# Patient Record
Sex: MALE | Race: WHITE | NOT HISPANIC OR LATINO | URBAN - METROPOLITAN AREA
[De-identification: names, ages, dates, MRNs, and addresses within clinical notes are randomized per-mention and may not be internally consistent; named-entity substitution may affect disease eponyms.]

---

## 2021-03-17 ENCOUNTER — IMMUNIZATIONS (OUTPATIENT)
Dept: FAMILY MEDICINE CLINIC | Facility: HOSPITAL | Age: 71
End: 2021-03-17

## 2021-03-17 DIAGNOSIS — Z23 ENCOUNTER FOR IMMUNIZATION: Primary | ICD-10-CM

## 2021-03-17 PROCEDURE — 91301 SARS-COV-2 / COVID-19 MRNA VACCINE (MODERNA) 100 MCG: CPT

## 2021-03-17 PROCEDURE — 0011A SARS-COV-2 / COVID-19 MRNA VACCINE (MODERNA) 100 MCG: CPT

## 2021-04-14 ENCOUNTER — IMMUNIZATIONS (OUTPATIENT)
Dept: FAMILY MEDICINE CLINIC | Facility: HOSPITAL | Age: 71
End: 2021-04-14

## 2021-04-14 DIAGNOSIS — Z23 ENCOUNTER FOR IMMUNIZATION: Primary | ICD-10-CM

## 2021-04-14 PROCEDURE — 91301 SARS-COV-2 / COVID-19 MRNA VACCINE (MODERNA) 100 MCG: CPT

## 2021-04-14 PROCEDURE — 0012A SARS-COV-2 / COVID-19 MRNA VACCINE (MODERNA) 100 MCG: CPT

## 2022-10-20 ENCOUNTER — NURSING HOME VISIT (OUTPATIENT)
Dept: GERIATRICS | Facility: OTHER | Age: 72
End: 2022-10-20
Payer: COMMERCIAL

## 2022-10-20 DIAGNOSIS — E87.1 HYPONATREMIA: ICD-10-CM

## 2022-10-20 DIAGNOSIS — D62 ACUTE BLOOD LOSS ANEMIA: ICD-10-CM

## 2022-10-20 DIAGNOSIS — G06.0 BRAIN ABSCESS: Primary | ICD-10-CM

## 2022-10-20 DIAGNOSIS — R53.1 RIGHT SIDED WEAKNESS: ICD-10-CM

## 2022-10-20 DIAGNOSIS — R13.12 OROPHARYNGEAL DYSPHAGIA: ICD-10-CM

## 2022-10-20 DIAGNOSIS — E11.21 TYPE 2 DIABETES MELLITUS WITH DIABETIC NEPHROPATHY, WITHOUT LONG-TERM CURRENT USE OF INSULIN (HCC): ICD-10-CM

## 2022-10-20 DIAGNOSIS — N18.32 STAGE 3B CHRONIC KIDNEY DISEASE (HCC): ICD-10-CM

## 2022-10-20 DIAGNOSIS — R26.2 AMBULATORY DYSFUNCTION: ICD-10-CM

## 2022-10-20 DIAGNOSIS — A43.9 NOCARDIA INFECTION: ICD-10-CM

## 2022-10-20 DIAGNOSIS — R47.1 DYSARTHRIA: ICD-10-CM

## 2022-10-20 DIAGNOSIS — I48.19 PERSISTENT ATRIAL FIBRILLATION (HCC): ICD-10-CM

## 2022-10-20 PROBLEM — R19.5 OCCULT BLOOD IN STOOLS: Status: ACTIVE | Noted: 2022-07-27

## 2022-10-20 PROBLEM — R41.89 IMPAIRED COGNITION: Status: ACTIVE | Noted: 2022-09-22

## 2022-10-20 PROBLEM — Z78.9 IMPAIRED MOBILITY AND ADLS: Status: ACTIVE | Noted: 2022-09-22

## 2022-10-20 PROBLEM — H40.10X0 OPEN-ANGLE GLAUCOMA OF BOTH EYES: Status: ACTIVE | Noted: 2021-06-30

## 2022-10-20 PROBLEM — D72.823 LEUKEMOID REACTION: Status: ACTIVE | Noted: 2022-07-13

## 2022-10-20 PROBLEM — E78.5 HYPERLIPIDEMIA: Status: ACTIVE | Noted: 2019-07-29

## 2022-10-20 PROBLEM — E11.40 TYPE 2 DIABETES MELLITUS WITH DIABETIC NEUROPATHY, UNSPECIFIED (HCC): Status: ACTIVE | Noted: 2021-07-08

## 2022-10-20 PROBLEM — J30.0 VASOMOTOR RHINITIS: Status: ACTIVE | Noted: 2020-03-11

## 2022-10-20 PROBLEM — N18.30 CKD (CHRONIC KIDNEY DISEASE) STAGE 3, GFR 30-59 ML/MIN (HCC): Status: ACTIVE | Noted: 2022-07-07

## 2022-10-20 PROBLEM — M10.9 GOUT: Status: ACTIVE | Noted: 2022-07-27

## 2022-10-20 PROBLEM — G56.00 CARPAL TUNNEL SYNDROME: Status: ACTIVE | Noted: 2019-06-18

## 2022-10-20 PROBLEM — D69.6 THROMBOCYTOPENIA (HCC): Status: ACTIVE | Noted: 2022-08-03

## 2022-10-20 PROBLEM — Z74.09 IMPAIRED MOBILITY AND ADLS: Status: ACTIVE | Noted: 2022-09-22

## 2022-10-20 PROBLEM — E43 SEVERE PROTEIN-CALORIE MALNUTRITION (HCC): Status: ACTIVE | Noted: 2022-09-29

## 2022-10-20 PROBLEM — R11.10 POST-TUSSIVE VOMITING: Status: ACTIVE | Noted: 2022-10-19

## 2022-10-20 PROBLEM — M51.16 HERNIATION OF LUMBAR INTERVERTEBRAL DISC WITH RADICULOPATHY: Status: ACTIVE | Noted: 2019-07-29

## 2022-10-20 PROCEDURE — 99306 1ST NF CARE HIGH MDM 50: CPT | Performed by: INTERNAL MEDICINE

## 2022-10-20 RX ORDER — TRAZODONE HYDROCHLORIDE 100 MG/1
100 TABLET ORAL
COMMUNITY

## 2022-10-20 RX ORDER — CYCLOSPORINE 0.5 MG/ML
1 EMULSION OPHTHALMIC 2 TIMES DAILY
COMMUNITY
End: 2022-10-26

## 2022-10-20 RX ORDER — ACETAMINOPHEN 160 MG/5ML
1000 SOLUTION ORAL EVERY 6 HOURS PRN
COMMUNITY

## 2022-10-20 RX ORDER — LANOLIN ALCOHOL/MO/W.PET/CERES
3 CREAM (GRAM) TOPICAL
COMMUNITY

## 2022-10-20 RX ORDER — SULFAMETHOXAZOLE AND TRIMETHOPRIM 200; 40 MG/5ML; MG/5ML
20 SUSPENSION ORAL 4 TIMES DAILY
COMMUNITY

## 2022-10-20 RX ORDER — ONDANSETRON 4 MG/1
4 TABLET, FILM COATED ORAL EVERY 8 HOURS PRN
COMMUNITY

## 2022-10-20 RX ORDER — SIMETHICONE 80 MG
80 TABLET,CHEWABLE ORAL EVERY 6 HOURS PRN
COMMUNITY

## 2022-10-20 RX ORDER — LIDOCAINE 4 G/G
PATCH TOPICAL
COMMUNITY

## 2022-10-20 RX ORDER — OXYCODONE HYDROCHLORIDE 5 MG/1
2.5 TABLET ORAL EVERY 6 HOURS PRN
COMMUNITY

## 2022-10-20 NOTE — ASSESSMENT & PLAN NOTE
norcardia  Continue anbiotics  Continue monitoring for signs of infection   S/p surgery   Continue follow up with ID and neurosurgery

## 2022-10-20 NOTE — ASSESSMENT & PLAN NOTE
Developed after second admission when had hyponatremia and being intubation after 3 rd time he was taken off the vent then he had the tube feed placed
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
## 2022-10-20 NOTE — ASSESSMENT & PLAN NOTE
Lab Results   Component Value Date    HGBA1C 5 1 10/03/2022     Continue with diet control  Continue monitoring blood sugars

## 2022-10-22 ENCOUNTER — TELEPHONE (OUTPATIENT)
Dept: OTHER | Facility: OTHER | Age: 72
End: 2022-10-22

## 2022-10-22 NOTE — TELEPHONE ENCOUNTER
Juan with fellowship calling regarding patient, states that he has a questionable rash, unsure of shingles  Paged on call via TC

## 2022-10-26 ENCOUNTER — NURSING HOME VISIT (OUTPATIENT)
Dept: GERIATRICS | Facility: OTHER | Age: 72
End: 2022-10-26
Payer: COMMERCIAL

## 2022-10-26 DIAGNOSIS — E11.40 TYPE 2 DIABETES MELLITUS WITH DIABETIC NEUROPATHY, WITHOUT LONG-TERM CURRENT USE OF INSULIN (HCC): ICD-10-CM

## 2022-10-26 DIAGNOSIS — R09.82 POST-NASAL DRIP: ICD-10-CM

## 2022-10-26 DIAGNOSIS — R13.12 OROPHARYNGEAL DYSPHAGIA: ICD-10-CM

## 2022-10-26 DIAGNOSIS — I48.19 PERSISTENT ATRIAL FIBRILLATION (HCC): ICD-10-CM

## 2022-10-26 DIAGNOSIS — B02.9 HERPES ZOSTER WITHOUT COMPLICATION: Primary | ICD-10-CM

## 2022-10-26 PROCEDURE — 99310 SBSQ NF CARE HIGH MDM 45: CPT | Performed by: INTERNAL MEDICINE

## 2022-10-26 RX ORDER — GABAPENTIN 100 MG/1
100 CAPSULE ORAL
COMMUNITY

## 2022-10-26 RX ORDER — FLUTICASONE PROPIONATE 50 MCG
1 SPRAY, SUSPENSION (ML) NASAL DAILY
COMMUNITY

## 2022-10-26 NOTE — ASSESSMENT & PLAN NOTE
Lab Results   Component Value Date    HGBA1C 5 1 10/03/2022     Patient starting to have shooting pain in the left legs and used to be on neurontin will start low dose and see how he tolerates and adjust as needed

## 2022-10-26 NOTE — ASSESSMENT & PLAN NOTE
Changed tube feeding overnight as patient was having episodes of vomiting and not taking much orally due to getting tube feed  Continue with speech therapy  Continue monitoring for aspiration
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
## 2022-10-26 NOTE — ASSESSMENT & PLAN NOTE
Discussed with wife about the anticoagulation and the risk of starting  She does understand it and she will also speak with the cardiology and neurosurgery about it before making any decision     Continue current meds

## 2022-11-02 ENCOUNTER — NURSING HOME VISIT (OUTPATIENT)
Dept: GERIATRICS | Facility: OTHER | Age: 72
End: 2022-11-02

## 2022-11-02 DIAGNOSIS — E11.40 TYPE 2 DIABETES MELLITUS WITH DIABETIC NEUROPATHY, WITHOUT LONG-TERM CURRENT USE OF INSULIN (HCC): ICD-10-CM

## 2022-11-02 DIAGNOSIS — G06.0 BRAIN ABSCESS: Primary | ICD-10-CM

## 2022-11-02 DIAGNOSIS — R13.12 OROPHARYNGEAL DYSPHAGIA: ICD-10-CM
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
## 2022-11-02 NOTE — ASSESSMENT & PLAN NOTE
Continues on antibiotics  Continues to work with therapy   Reviewed therapy notes  Discussed with therapist about patient progress  Continue with fall precautions  Continue follow up with neurosurgery

## 2022-11-02 NOTE — ASSESSMENT & PLAN NOTE
Lab Results   Component Value Date    HGBA1C 5 1 10/03/2022     Increase neurontin to 100mg  Am and afternoon but at bedtime 200mg po  Which will also help with sleep along with neuropathy but does report pain has improved     Continue all other current meds

## 2022-11-02 NOTE — ASSESSMENT & PLAN NOTE
Speech therapist working with patient  Discussed with therapist about the progress  Continue with night time tube feeding  Continue with aspiration precautions

## 2022-11-06 DIAGNOSIS — G62.9 NEUROPATHY: Primary | ICD-10-CM

## 2022-11-07 RX ORDER — OXYCODONE HCL 5 MG/5 ML
SOLUTION, ORAL ORAL
Qty: 150 ML | Refills: 0 | Status: SHIPPED | OUTPATIENT
Start: 2022-11-07 | End: 2022-11-16

## 2022-11-09 ENCOUNTER — NURSING HOME VISIT (OUTPATIENT)
Dept: GERIATRICS | Facility: OTHER | Age: 72
End: 2022-11-09

## 2022-11-09 DIAGNOSIS — R26.2 AMBULATORY DYSFUNCTION: Primary | ICD-10-CM

## 2022-11-09 DIAGNOSIS — R13.12 OROPHARYNGEAL DYSPHAGIA: ICD-10-CM

## 2022-11-09 DIAGNOSIS — A43.9 NOCARDIA INFECTION: ICD-10-CM

## 2022-11-09 NOTE — ASSESSMENT & PLAN NOTE
Some progress as per therapy and at times fluctuate day to day   Continues with therapy  Discussed with therapist  Continue with safety measures

## 2022-11-09 NOTE — ASSESSMENT & PLAN NOTE
As of late has made some progress  Speech therapist continues to work with patient   Will get a video swallow eval  Continue monitoring for aspiration
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
## 2022-11-15 DIAGNOSIS — G62.9 NEUROPATHY: Primary | ICD-10-CM

## 2022-11-15 RX ORDER — OXYCODONE HYDROCHLORIDE 5 MG/1
TABLET ORAL
Qty: 30 TABLET | Refills: 0 | Status: SHIPPED | OUTPATIENT
Start: 2022-11-15

## 2022-11-16 ENCOUNTER — NURSING HOME VISIT (OUTPATIENT)
Dept: GERIATRICS | Facility: OTHER | Age: 72
End: 2022-11-16

## 2022-11-16 DIAGNOSIS — R13.12 OROPHARYNGEAL DYSPHAGIA: Primary | ICD-10-CM

## 2022-11-16 DIAGNOSIS — I48.19 PERSISTENT ATRIAL FIBRILLATION (HCC): ICD-10-CM

## 2022-11-16 DIAGNOSIS — R53.1 RIGHT SIDED WEAKNESS: ICD-10-CM

## 2022-11-16 RX ORDER — ACETAMINOPHEN 325 MG/1
650 TABLET ORAL EVERY 6 HOURS PRN
COMMUNITY

## 2022-11-16 RX ORDER — CHOLECALCIFEROL (VITAMIN D3) 125 MCG
1 CAPSULE ORAL
COMMUNITY

## 2022-11-16 RX ORDER — LANSOPRAZOLE 15 MG/1
15 CAPSULE, DELAYED RELEASE ORAL DAILY
COMMUNITY

## 2022-11-16 NOTE — ASSESSMENT & PLAN NOTE
Continues to work with therapy  Reviewed therapy notes  Discussed with therapist about progress  Continue with fall precautions

## 2022-11-16 NOTE — ASSESSMENT & PLAN NOTE
Rate controlled  Has a video visit with cardiology tomorrow  Also asked patient to discussed with cardiology about the blood thinner  Continue current meds
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
## 2022-11-16 NOTE — ASSESSMENT & PLAN NOTE
Tube feeding stopped  Video swallow study was then done and did well  Now on regular diet  Continue monitoring for aspiration  Also changed meds to oral meds

## 2022-11-23 ENCOUNTER — NURSING HOME VISIT (OUTPATIENT)
Dept: GERIATRICS | Facility: OTHER | Age: 72
End: 2022-11-23

## 2022-11-23 DIAGNOSIS — R53.1 RIGHT SIDED WEAKNESS: Primary | ICD-10-CM

## 2022-11-23 DIAGNOSIS — R13.12 OROPHARYNGEAL DYSPHAGIA: ICD-10-CM

## 2022-11-23 DIAGNOSIS — N18.32 STAGE 3B CHRONIC KIDNEY DISEASE (HCC): ICD-10-CM

## 2022-11-23 RX ORDER — CETIRIZINE HYDROCHLORIDE 10 MG/1
10 TABLET ORAL DAILY
COMMUNITY

## 2022-11-23 RX ORDER — SULFAMETHOXAZOLE AND TRIMETHOPRIM 800; 160 MG/1; MG/1
1 TABLET ORAL EVERY 12 HOURS SCHEDULED
COMMUNITY

## 2022-11-23 NOTE — ASSESSMENT & PLAN NOTE
Continues to work with therapy  Reviewed therapy notes  Discussed with therapist about patient  Continue with safety measures
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
## 2022-11-23 NOTE — ASSESSMENT & PLAN NOTE
All meds and feeding changed from tube to oral  Continue current regular diet  Continue monitoring for aspiration  Discussed with speech therapist about the patient

## 2022-12-13 ENCOUNTER — NURSING HOME VISIT (OUTPATIENT)
Dept: GERIATRICS | Facility: OTHER | Age: 72
End: 2022-12-13

## 2022-12-13 DIAGNOSIS — I48.19 PERSISTENT ATRIAL FIBRILLATION (HCC): ICD-10-CM

## 2022-12-13 DIAGNOSIS — R13.12 OROPHARYNGEAL DYSPHAGIA: ICD-10-CM

## 2022-12-13 DIAGNOSIS — R26.2 AMBULATORY DYSFUNCTION: Primary | ICD-10-CM

## 2022-12-13 RX ORDER — ACETAMINOPHEN 500 MG
500 TABLET ORAL EVERY 12 HOURS
COMMUNITY

## 2022-12-13 RX ORDER — SENNA AND DOCUSATE SODIUM 50; 8.6 MG/1; MG/1
1 TABLET, FILM COATED ORAL DAILY
COMMUNITY

## 2022-12-13 RX ORDER — CYCLOSPORINE 0.5 MG/ML
1 EMULSION OPHTHALMIC EVERY 12 HOURS
COMMUNITY

## 2022-12-13 RX ORDER — POLYETHYLENE GLYCOL 3350 17 G/17G
17 POWDER, FOR SOLUTION ORAL DAILY
COMMUNITY
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
## 2022-12-13 NOTE — ASSESSMENT & PLAN NOTE
Off of therapy  Reviewed patient last therapy notes  Now on restorative  Continue with restorative  Continue with safety measures

## 2022-12-13 NOTE — ASSESSMENT & PLAN NOTE
Taking all meds and eating orally  Will get GI to evaluate for removal of tube  Continue current diet  Continue monitoring for aspiration

## 2022-12-13 NOTE — ASSESSMENT & PLAN NOTE
Talked to wife about the visit and reports that they want to wait on restarting the anticoagulation  Continue current meds  Continue monitoring rate

## 2023-01-13 ENCOUNTER — TELEPHONE (OUTPATIENT)
Dept: OTHER | Facility: OTHER | Age: 73
End: 2023-01-13

## 2023-01-14 NOTE — TELEPHONE ENCOUNTER
[address] nurse from [address] [place] [address] has called to inform the oncal provider that the patients joshua has a blockage and unable to insert new one in  His belly is distended and need the ok to send to the Er   Please call [facility] - [facility] Hamlin Deshawn- [phone]

## 2023-01-16 ENCOUNTER — NURSING HOME VISIT (OUTPATIENT)
Dept: GERIATRICS | Facility: OTHER | Age: 73
End: 2023-01-16

## 2023-01-16 DIAGNOSIS — N18.32 STAGE 3B CHRONIC KIDNEY DISEASE (HCC): Primary | ICD-10-CM

## 2023-01-16 DIAGNOSIS — A43.9 NOCARDIA INFECTION: ICD-10-CM

## 2023-01-16 DIAGNOSIS — D62 ACUTE BLOOD LOSS ANEMIA: ICD-10-CM

## 2023-01-16 PROBLEM — N13.9 OBSTRUCTIVE UROPATHY: Status: ACTIVE | Noted: 2023-01-16

## 2023-01-16 NOTE — ASSESSMENT & PLAN NOTE
Joshua was difficult to change at the facility as result patient was sent to the ER  Patient had joshua cath placed and returned  He did have urine culture sent at the hospital    As per wife the urine is growing Klebsiella but not sensitivity out yet  He is on bactrim and currently no other symptoms present will wait on culture results and does have follow up with ID which wife reports will discuss with them also
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
## 2023-01-18 ENCOUNTER — NURSING HOME VISIT (OUTPATIENT)
Dept: GERIATRICS | Facility: OTHER | Age: 73
End: 2023-01-18

## 2023-01-18 DIAGNOSIS — N18.9 ACUTE KIDNEY INJURY SUPERIMPOSED ON CKD (HCC): Primary | ICD-10-CM

## 2023-01-18 DIAGNOSIS — B96.89 URINARY TRACT INFECTION DUE TO ESBL KLEBSIELLA: ICD-10-CM

## 2023-01-18 DIAGNOSIS — D64.9 ANEMIA, UNSPECIFIED TYPE: ICD-10-CM

## 2023-01-18 DIAGNOSIS — N17.9 ACUTE KIDNEY INJURY SUPERIMPOSED ON CKD (HCC): Primary | ICD-10-CM

## 2023-01-18 DIAGNOSIS — N39.0 URINARY TRACT INFECTION DUE TO ESBL KLEBSIELLA: ICD-10-CM

## 2023-01-18 NOTE — ASSESSMENT & PLAN NOTE
Hb is 8 8 trending down with his kidney function being worst if fluids given the H/H will look lower  No active sign of bleeding  Will wait wife to get back and if he stays will repeat CBC

## 2023-01-18 NOTE — ASSESSMENT & PLAN NOTE
Urine culture from the hospital came back ESBL klebsiella which is resistant to bactrim which he is on for nocardia infection    Wife reports will talk to the ID and call back
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
## 2023-01-18 NOTE — ASSESSMENT & PLAN NOTE
Today's Cr worst then before it was trending good in November to 1 37 but today is 2 87 which has significantly worsened  Patient reports no change in fluid intake  Did have problem with catheter but was changed in the ER because difficulty changing at the facility   Talked to wife she will call back about sending him to the hospital vs trying IV fluids at the facility but due to his h/h trending down it maybe better to go to hospital I told her     Also maybe some contribution from bactrim which wife is going to talk to ID

## 2023-01-26 ENCOUNTER — NURSING HOME VISIT (OUTPATIENT)
Dept: GERIATRICS | Facility: OTHER | Age: 73
End: 2023-01-26

## 2023-01-26 DIAGNOSIS — R18.8 OTHER ASCITES: ICD-10-CM

## 2023-01-26 DIAGNOSIS — N17.9 AKI (ACUTE KIDNEY INJURY) (HCC): Primary | ICD-10-CM

## 2023-01-26 DIAGNOSIS — D64.9 ANEMIA, UNSPECIFIED TYPE: ICD-10-CM

## 2023-01-26 DIAGNOSIS — A43.9 NOCARDIA INFECTION: ICD-10-CM

## 2023-01-26 DIAGNOSIS — E11.40 TYPE 2 DIABETES MELLITUS WITH DIABETIC NEUROPATHY, WITHOUT LONG-TERM CURRENT USE OF INSULIN (HCC): ICD-10-CM

## 2023-01-26 DIAGNOSIS — D69.6 THROMBOCYTOPENIA (HCC): ICD-10-CM

## 2023-01-26 DIAGNOSIS — E83.52 HYPERCALCEMIA: ICD-10-CM

## 2023-01-26 DIAGNOSIS — U07.1 COVID-19 VIRUS INFECTION: ICD-10-CM

## 2023-01-26 PROBLEM — Z98.890 H/O CRANIOTOMY: Status: ACTIVE | Noted: 2023-01-18

## 2023-01-26 PROBLEM — I48.0 PAROXYSMAL ATRIAL FIBRILLATION (HCC): Status: ACTIVE | Noted: 2021-11-24

## 2023-01-26 NOTE — ASSESSMENT & PLAN NOTE
Retested positive in the hospital but was positive in December when he was treated with paxlovid  Patient again treated with paxlovid as per wife     Does not require isolation, most likely residual

## 2023-01-26 NOTE — ASSESSMENT & PLAN NOTE
Bactrim was held in the hospital for few days due to kidney function but restarted after nephrology and ID evaluation

## 2023-01-26 NOTE — ASSESSMENT & PLAN NOTE
Cr 1 87 last one in the hospital  Will repeat BMP  Encourage of fluids   Reviewed nephrology consult  Continue follow up with nephrology

## 2023-01-26 NOTE — ASSESSMENT & PLAN NOTE
Had paracentesis of 3 7L  Had ECHO report EF 60-65% and grade I diastolic dysfunction  Unsure exact cause   Continue monitoring symptoms
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
## 2023-02-14 ENCOUNTER — NURSING HOME VISIT (OUTPATIENT)
Dept: GERIATRICS | Facility: OTHER | Age: 73
End: 2023-02-14

## 2023-02-14 DIAGNOSIS — N18.9 ACUTE KIDNEY INJURY SUPERIMPOSED ON CKD (HCC): Primary | ICD-10-CM

## 2023-02-14 DIAGNOSIS — D64.9 ANEMIA, UNSPECIFIED TYPE: ICD-10-CM

## 2023-02-14 DIAGNOSIS — R14.0 ABDOMINAL DISTENSION: ICD-10-CM

## 2023-02-14 DIAGNOSIS — R18.8 OTHER ASCITES: ICD-10-CM

## 2023-02-14 DIAGNOSIS — N17.9 ACUTE KIDNEY INJURY SUPERIMPOSED ON CKD (HCC): Primary | ICD-10-CM

## 2023-02-14 RX ORDER — SULFAMETHOXAZOLE AND TRIMETHOPRIM 400; 80 MG/1; MG/1
0.5 TABLET ORAL EVERY 12 HOURS SCHEDULED
COMMUNITY
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
## 2023-02-14 NOTE — ASSESSMENT & PLAN NOTE
Xray just showed gas pattern with no obstruction on 2/8/23    Had abdominal ultrasound done which showed small amount of free intraperitoneal fluid (small ascites) and small right pleural effusion   Continue current meds

## 2023-02-22 ENCOUNTER — NURSING HOME VISIT (OUTPATIENT)
Dept: GERIATRICS | Facility: OTHER | Age: 73
End: 2023-02-22

## 2023-02-22 DIAGNOSIS — R18.8 OTHER ASCITES: ICD-10-CM

## 2023-02-22 DIAGNOSIS — R14.0 ABDOMINAL DISTENSION: Primary | ICD-10-CM

## 2023-02-22 DIAGNOSIS — R19.5 LOOSE STOOLS: ICD-10-CM

## 2023-02-22 DIAGNOSIS — N17.9 ACUTE KIDNEY INJURY SUPERIMPOSED ON CKD (HCC): ICD-10-CM

## 2023-02-22 DIAGNOSIS — E87.1 HYPONATREMIA: ICD-10-CM

## 2023-02-22 DIAGNOSIS — N18.9 ACUTE KIDNEY INJURY SUPERIMPOSED ON CKD (HCC): ICD-10-CM

## 2023-02-22 RX ORDER — KETOCONAZOLE 20 MG/G
1 CREAM TOPICAL 2 TIMES DAILY
COMMUNITY

## 2023-02-22 RX ORDER — SIMETHICONE 80 MG
80 TABLET,CHEWABLE ORAL DAILY
COMMUNITY

## 2023-02-22 NOTE — ASSESSMENT & PLAN NOTE
Seems to be worst patient in discomfort  Intermittent episodes of vomiting  Abdominal US repeat ordered and done was waiting for result but patient currently being sent to the hospital    Stat abdominal xray was ordered but not done yet  Concern for ileus vs colitis vs ascites worsening  Discussed with wife if he does have ascites he cannot be tapped here he will have to go out either way     Also has been having poor appetite and loosing weight

## 2023-02-22 NOTE — ASSESSMENT & PLAN NOTE
Sodium again down to 129   Possibly SIADH  He has not been taking much fluids and kidney function is up again     Did discussed with wife about treating with IV fluids but the concern explained to her is that some of the fluids will end up in the abdomen and may his distension worst at which point he will need to have the fluids taped still which maybe better for him to evaluated in the hospital

## 2023-02-22 NOTE — ASSESSMENT & PLAN NOTE
Cr is 1 97 which has gotten worst from 1 44  And discussed as early written with wife about giving fluids but may make the abdominal distention worst

## 2023-02-22 NOTE — ASSESSMENT & PLAN NOTE
Every hour patient seems to be have several episodes of loose stools and not even able to get out of bed because of the loose stool  Had some of his medications decreased and discontinue but continues to have several episodes each hour     Will benefit from GI consult which he was scheduled but maybe better to have it done while in the hospital

## 2023-02-22 NOTE — ASSESSMENT & PLAN NOTE
Unsure the cause  Will need further work up and benefit from eval at the hospital due to multiple active problems at present time  Did have ultrasound done but awaiting results
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
## 2023-03-02 ENCOUNTER — TELEPHONE (OUTPATIENT)
Dept: OTHER | Facility: OTHER | Age: 73
End: 2023-03-02

## 2023-03-03 ENCOUNTER — NURSING HOME VISIT (OUTPATIENT)
Dept: GERIATRICS | Facility: OTHER | Age: 73
End: 2023-03-03

## 2023-03-03 DIAGNOSIS — D64.9 ANEMIA, UNSPECIFIED TYPE: ICD-10-CM

## 2023-03-03 DIAGNOSIS — R18.8 OTHER ASCITES: ICD-10-CM

## 2023-03-03 DIAGNOSIS — A04.72 CLOSTRIDIUM DIFFICILE COLITIS: Primary | ICD-10-CM

## 2023-03-03 DIAGNOSIS — N13.9 OBSTRUCTIVE UROPATHY: ICD-10-CM

## 2023-03-03 DIAGNOSIS — N17.9 AKI (ACUTE KIDNEY INJURY) (HCC): ICD-10-CM

## 2023-03-03 DIAGNOSIS — A43.9 NOCARDIA INFECTION: ICD-10-CM

## 2023-03-03 DIAGNOSIS — E87.1 HYPONATREMIA: ICD-10-CM

## 2023-03-03 DIAGNOSIS — R26.2 AMBULATORY DYSFUNCTION: ICD-10-CM

## 2023-03-03 PROBLEM — K52.9 COLITIS: Status: ACTIVE | Noted: 2023-02-22

## 2023-03-03 PROBLEM — D72.829 LEUKOCYTOSIS: Status: ACTIVE | Noted: 2023-02-22

## 2023-03-03 RX ORDER — SPIRONOLACTONE 25 MG/1
25 TABLET ORAL DAILY
COMMUNITY

## 2023-03-03 RX ORDER — SODIUM BICARBONATE 650 MG/1
1300 TABLET ORAL 2 TIMES DAILY
COMMUNITY

## 2023-03-03 RX ORDER — SENNA AND DOCUSATE SODIUM 50; 8.6 MG/1; MG/1
1 TABLET, FILM COATED ORAL
COMMUNITY

## 2023-03-03 RX ORDER — POLYETHYLENE GLYCOL 3350 17 G/17G
17 POWDER, FOR SOLUTION ORAL DAILY
COMMUNITY

## 2023-03-03 RX ORDER — FUROSEMIDE 20 MG/1
20 TABLET ORAL DAILY
COMMUNITY

## 2023-03-03 RX ORDER — VANCOMYCIN HYDROCHLORIDE 125 MG/1
125 CAPSULE ORAL 4 TIMES DAILY
COMMUNITY
End: 2023-03-08

## 2023-03-03 NOTE — ASSESSMENT & PLAN NOTE
Still not sure what exact cause is  CT did show cirrhosis of liver but does not seem to be the major contributing factor as per report  Had several removal done of ascites with also albumin  About 5L first time then 4L few days later and last about 2L before coming to the facility  Will repeat US next Wednesday  Continue follow up with GI

## 2023-03-03 NOTE — ASSESSMENT & PLAN NOTE
Improved now as Cr is 1 06  Continue encouraging oral intake  Will repeat and monitor as patient started on aldactone and lasix   Was seen by nephrology in the hospital

## 2023-03-03 NOTE — ASSESSMENT & PLAN NOTE
Multifactorial but mainly due to weakness from the brain surgery  Continue with activity as tolerable   Continue with safety measures

## 2023-03-19 PROBLEM — B96.89 URINARY TRACT INFECTION DUE TO ESBL KLEBSIELLA: Status: RESOLVED | Noted: 2023-01-18 | Resolved: 2023-03-19

## 2023-03-19 PROBLEM — N39.0 URINARY TRACT INFECTION DUE TO ESBL KLEBSIELLA: Status: RESOLVED | Noted: 2023-01-18 | Resolved: 2023-03-19

## 2023-04-07 ENCOUNTER — NURSING HOME VISIT (OUTPATIENT)
Dept: GERIATRICS | Facility: OTHER | Age: 73
End: 2023-04-07

## 2023-04-07 DIAGNOSIS — N18.32 STAGE 3B CHRONIC KIDNEY DISEASE (HCC): Primary | ICD-10-CM

## 2023-04-07 DIAGNOSIS — R26.2 AMBULATORY DYSFUNCTION: ICD-10-CM

## 2023-04-07 DIAGNOSIS — I48.0 PAROXYSMAL ATRIAL FIBRILLATION (HCC): ICD-10-CM

## 2023-04-07 DIAGNOSIS — R18.8 OTHER ASCITES: ICD-10-CM

## 2023-04-07 DIAGNOSIS — A04.72 CLOSTRIDIUM DIFFICILE COLITIS: ICD-10-CM
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
## 2023-04-07 NOTE — ASSESSMENT & PLAN NOTE
Had paracentesis done as out patient last month  Will repeat US of abdomen next week  Continue monitoring for worsening symptoms  Continue with GI follow up as scheduled

## 2023-04-07 NOTE — ASSESSMENT & PLAN NOTE
Continues to work with therapy  Patient making progress  Discussed with therapy about patient  Continue with safety measures   Continue with fall precautions

## 2023-05-03 ENCOUNTER — NURSING HOME VISIT (OUTPATIENT)
Dept: GERIATRICS | Facility: OTHER | Age: 73
End: 2023-05-03

## 2023-05-03 DIAGNOSIS — R18.8 OTHER ASCITES: Primary | ICD-10-CM

## 2023-05-03 DIAGNOSIS — R26.2 AMBULATORY DYSFUNCTION: ICD-10-CM

## 2023-05-03 DIAGNOSIS — N18.31 STAGE 3A CHRONIC KIDNEY DISEASE (HCC): ICD-10-CM

## 2023-05-03 PROBLEM — I10 ESSENTIAL (PRIMARY) HYPERTENSION: Status: ACTIVE | Noted: 2023-04-12

## 2023-05-03 PROBLEM — E87.22 CHRONIC METABOLIC ACIDOSIS: Status: ACTIVE | Noted: 2023-04-12

## 2023-05-03 PROBLEM — K74.60 CIRRHOSIS OF LIVER (HCC): Status: ACTIVE | Noted: 2023-04-12

## 2023-05-03 PROBLEM — Z96.0: Status: ACTIVE | Noted: 2023-04-12

## 2023-05-03 PROBLEM — Z79.2 LONG TERM (CURRENT) USE OF ANTIBIOTICS: Status: ACTIVE | Noted: 2023-04-12

## 2023-05-03 NOTE — ASSESSMENT & PLAN NOTE
Distended abdomen with discomfort  Had US sound done which shows ascites and since abdomen is more distended GI was contacted and had scheduled patient for paracentesis for tomorrow  Continue monitoring symptoms   Discussed the result with wife   Also has liver biopsy scheduled
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
## 2023-05-03 NOTE — ASSESSMENT & PLAN NOTE
Due to his overall condition and requiring to be at a facility wrote a letter and was given to wife due to his condition     Continue with restorative, now off of therapy

## 2023-06-09 ENCOUNTER — NURSING HOME VISIT (OUTPATIENT)
Dept: GERIATRICS | Facility: OTHER | Age: 73
End: 2023-06-09
Payer: COMMERCIAL

## 2023-06-09 DIAGNOSIS — K59.00 CONSTIPATION, UNSPECIFIED CONSTIPATION TYPE: ICD-10-CM

## 2023-06-09 DIAGNOSIS — N13.9 OBSTRUCTIVE UROPATHY: Primary | ICD-10-CM

## 2023-06-09 DIAGNOSIS — R18.8 OTHER ASCITES: ICD-10-CM

## 2023-06-09 PROCEDURE — 99309 SBSQ NF CARE MODERATE MDM 30: CPT | Performed by: INTERNAL MEDICINE

## 2023-06-09 RX ORDER — TAMSULOSIN HYDROCHLORIDE 0.4 MG/1
0.4 CAPSULE ORAL
COMMUNITY

## 2023-06-09 NOTE — ASSESSMENT & PLAN NOTE
Symptoms seems to be worst  Now scheduled for paracentesis today  Continue monitoring symptoms after  Continue follow up with GI
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
## 2023-06-09 NOTE — ASSESSMENT & PLAN NOTE
Continue current meds  Continue monitoring symptoms   Informed patient if he still has problems after paracentesis which maybe contributing to his symptoms then will adjust meds

## 2023-07-17 ENCOUNTER — NURSING HOME VISIT (OUTPATIENT)
Dept: GERIATRICS | Facility: OTHER | Age: 73
End: 2023-07-17
Payer: COMMERCIAL

## 2023-07-17 DIAGNOSIS — I48.0 PAROXYSMAL ATRIAL FIBRILLATION (HCC): ICD-10-CM

## 2023-07-17 DIAGNOSIS — R18.8 OTHER ASCITES: Primary | ICD-10-CM

## 2023-07-17 DIAGNOSIS — N17.9 AKI (ACUTE KIDNEY INJURY) (HCC): ICD-10-CM

## 2023-07-17 PROCEDURE — 99309 SBSQ NF CARE MODERATE MDM 30: CPT | Performed by: INTERNAL MEDICINE

## 2023-07-17 NOTE — ASSESSMENT & PLAN NOTE
Rate controlled  Continue current meds  Continue monitoring rate  Continue follow up as scheduled with cardiology

## 2023-07-17 NOTE — ASSESSMENT & PLAN NOTE
Continues to get tapped to decreased fluids  Scheduled to get an ECHO today prior to Cardiology follow up  Continue current meds  Continue monitoring symptoms

## 2023-07-17 NOTE — ASSESSMENT & PLAN NOTE
Cr slowly trending up now at 2.00  Continue monitoring closely   Will repeat tomorrow and fax over results to nephrology after
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
## 2023-08-07 ENCOUNTER — NURSING HOME VISIT (OUTPATIENT)
Dept: GERIATRICS | Facility: OTHER | Age: 73
End: 2023-08-07
Payer: COMMERCIAL

## 2023-08-07 DIAGNOSIS — R18.8 OTHER ASCITES: ICD-10-CM

## 2023-08-07 DIAGNOSIS — G62.9 POLYNEUROPATHY: Primary | ICD-10-CM

## 2023-08-07 DIAGNOSIS — R26.2 AMBULATORY DYSFUNCTION: ICD-10-CM

## 2023-08-07 DIAGNOSIS — N13.9 OBSTRUCTIVE UROPATHY: ICD-10-CM

## 2023-08-07 PROCEDURE — 99309 SBSQ NF CARE MODERATE MDM 30: CPT | Performed by: INTERNAL MEDICINE

## 2023-08-07 RX ORDER — ALLOPURINOL 100 MG/1
50 TABLET ORAL DAILY
COMMUNITY
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
## 2023-09-08 ENCOUNTER — NURSING HOME VISIT (OUTPATIENT)
Dept: GERIATRICS | Facility: OTHER | Age: 73
End: 2023-09-08
Payer: COMMERCIAL

## 2023-09-08 DIAGNOSIS — N17.9 ACUTE KIDNEY INJURY SUPERIMPOSED ON CKD (HCC): Primary | ICD-10-CM

## 2023-09-08 DIAGNOSIS — K59.00 CONSTIPATION, UNSPECIFIED CONSTIPATION TYPE: ICD-10-CM

## 2023-09-08 DIAGNOSIS — D64.9 ANEMIA, UNSPECIFIED TYPE: ICD-10-CM

## 2023-09-08 DIAGNOSIS — N18.9 ACUTE KIDNEY INJURY SUPERIMPOSED ON CKD (HCC): Primary | ICD-10-CM

## 2023-09-08 DIAGNOSIS — G06.0 BRAIN ABSCESS: ICD-10-CM

## 2023-09-08 PROBLEM — N20.0 NEPHROLITHIASIS: Status: ACTIVE | Noted: 2023-08-17

## 2023-09-08 PROBLEM — I12.9 HYPERTENSIVE CHRONIC KIDNEY DISEASE WITH STAGE 1 THROUGH STAGE 4 CHRONIC KIDNEY DISEASE, OR UNSPECIFIED CHRONIC KIDNEY DISEASE: Status: ACTIVE | Noted: 2023-04-12

## 2023-09-08 PROBLEM — R82.998 CRYSTALLURIA: Status: ACTIVE | Noted: 2023-08-17

## 2023-09-08 PROCEDURE — 99309 SBSQ NF CARE MODERATE MDM 30: CPT | Performed by: INTERNAL MEDICINE
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
## 2023-09-11 ENCOUNTER — NURSING HOME VISIT (OUTPATIENT)
Dept: WOUND CARE | Facility: HOSPITAL | Age: 73
End: 2023-09-11
Payer: COMMERCIAL

## 2023-09-11 DIAGNOSIS — R26.2 AMBULATORY DYSFUNCTION: ICD-10-CM

## 2023-09-11 DIAGNOSIS — E11.40 TYPE 2 DIABETES MELLITUS WITH DIABETIC NEUROPATHY, WITHOUT LONG-TERM CURRENT USE OF INSULIN (HCC): ICD-10-CM

## 2023-09-11 DIAGNOSIS — T14.8XXA DEEP TISSUE INJURY: Primary | ICD-10-CM

## 2023-09-11 PROCEDURE — 99304 1ST NF CARE SF/LOW MDM 25: CPT | Performed by: NURSE PRACTITIONER
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
## 2023-09-11 NOTE — ASSESSMENT & PLAN NOTE
A1C results reviewed with the patient today.   Lab Results   Component Value Date    HGBA1C 4.8 05/05/2023   Under the care of Senior care team

## 2023-09-11 NOTE — ASSESSMENT & PLAN NOTE
Left buttock  Purple, nonblanchable, with no obvious sign of infection  Local wound care with BPCO  Education provided to patient to continue to offload when in wheelchair.   Order air mattress  Follow-up next week

## 2023-09-11 NOTE — LETTER
Patient:  Franki Yuan   1950           JORDAN Nick saw Franki Yuan for a wound care visit on 9/11/2023. See below for information relating to this visit. No chief complaint on file. Assessment/Plan:  1. Deep tissue injury  Assessment & Plan:  Left buttock  Purple, nonblanchable, with no obvious sign of infection  Local wound care with BPCO  Education provided to patient to continue to offload when in wheelchair. Order air mattress  Follow-up next week      2. Ambulatory dysfunction  Assessment & Plan:  Patient is wheelchair-bound, will need repositioning when in chair  Work with restorative therapy      3. Type 2 diabetes mellitus with diabetic neuropathy, without long-term current use of insulin ([address])  Assessment & Plan:   A1C results reviewed with the patient today. Lab Results   Component Value Date    HGBA1C 4.8 05/05/2023   Under the care of Senior care team             Orders:  Franki Yuan  1950  Wound:  Left buttock  Discontinue previous wound order  Cleanse the wound bed with NSS   Apply non-sting skin prep to periwound area  Apply BPCO ointment to wound bed  Frequency : twice a day and prn for soiling  Order air mattress  Offload all wounds  Turn and reposition frequently,   Increase protein intake. Monitor for any sign of infection or worsening, inform PCP or patient's primary physician in your facility. Follow Up:  Return in about 1 week (around 9/18/2023). [address] [place] [address] hours are 8:00 am - 4:30 pm Monday through Friday. The center phone number is [phone]. You can also contact me directly thru my email at [facility]. [email or web address] or thru tiger text. If it is an emergency, please contact the PCP or patient's attending physician in your facility.      Sincerely,    Electronically signed by JORDAN Nick    Patient : Franki Yuan    1950

## 2023-09-18 ENCOUNTER — IN HOME VISIT (OUTPATIENT)
Dept: WOUND CARE | Facility: HOSPITAL | Age: 73
End: 2023-09-18
Payer: COMMERCIAL

## 2023-09-18 DIAGNOSIS — R26.2 AMBULATORY DYSFUNCTION: ICD-10-CM

## 2023-09-18 DIAGNOSIS — T14.8XXA DEEP TISSUE INJURY: Primary | ICD-10-CM

## 2023-09-18 PROCEDURE — 99348 HOME/RES VST EST LOW MDM 30: CPT | Performed by: NURSE PRACTITIONER

## 2023-09-18 NOTE — LETTER
Patient:  Isabell Gong   1950           JORDAN Castro saw Isabell Gong for a wound care visit on 9/18/2023. See below for information relating to this visit. Chief Complaint   Patient presents with    Follow Up Wound Care Visit     Left buttock        Assessment/Plan:  1. Deep tissue injury  Assessment & Plan:  Left buttock  Deep tissue injury is resolving, 100% , with no obvious sign of infection  Continue BPCO  Continue air mattress and offloading when in chair  Follow up ; PRN if wound is not healed      2. Ambulatory dysfunction  Assessment & Plan: On short-term rehab             Orders:  Isabell Gong  1950  Wound:  Left buttock  Discontinue previous wound order  Cleanse the wound bed with NSS   Apply non-sting skin prep to periwound area  Apply BPCO ointment to wound bed  Frequency : twice a day and prn for soiling  Offload all wounds  Turn and reposition frequently,   Increase protein intake. Monitor for any sign of infection or worsening, inform PCP or patient's primary physician in your facility. Follow Up:  Return in about 1 week (around 9/25/2023). [address] [place] [address] hours are 8:00 am - 4:30 pm Monday through Friday. The center phone number is [phone]. You can also contact me directly thru my email at [facility]. [email or web address] or thru tiger text. If it is an emergency, please contact the PCP or patient's attending physician in your facility.      Sincerely,    Electronically signed by JORDAN Castro    Patient : Isabell Gong    1950
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
## 2023-09-18 NOTE — ASSESSMENT & PLAN NOTE
Left buttock  Deep tissue injury is resolving, 100% , with no obvious sign of infection  Continue BPCO  Continue air mattress and offloading when in chair  Follow up ; PRN if wound is not healed

## 2023-10-06 ENCOUNTER — NURSING HOME VISIT (OUTPATIENT)
Dept: GERIATRICS | Facility: OTHER | Age: 73
End: 2023-10-06
Payer: COMMERCIAL

## 2023-10-06 DIAGNOSIS — R60.1 ANASARCA: Primary | ICD-10-CM

## 2023-10-06 DIAGNOSIS — A43.9 NOCARDIA INFECTION: ICD-10-CM

## 2023-10-06 DIAGNOSIS — I48.0 PAROXYSMAL ATRIAL FIBRILLATION (HCC): ICD-10-CM

## 2023-10-06 DIAGNOSIS — R26.2 AMBULATORY DYSFUNCTION: ICD-10-CM

## 2023-10-06 PROCEDURE — 99309 SBSQ NF CARE MODERATE MDM 30: CPT | Performed by: INTERNAL MEDICINE

## 2023-10-06 RX ORDER — SULFAMETHOXAZOLE AND TRIMETHOPRIM 800; 160 MG/1; MG/1
1 TABLET ORAL DAILY
COMMUNITY
Start: 2023-09-13

## 2023-10-06 NOTE — ASSESSMENT & PLAN NOTE
Unknown cause  Continued to be followed by nephrology and cardiology  Will increase lasix to 40mg po daily x 3 days due to slight increase in fluids and then go back down to 20mg po daily also due to concern from his kidney function   Continue all other current meds  Will repeat kidney function   Also has right side cardiac cath scheduled
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
## 2023-10-06 NOTE — ASSESSMENT & PLAN NOTE
Patient had made significant progress with acute therapy before but initially was slow in his progress.  Would recommend therapy to be continued but currently insurance has cut him   Continue with safety measures and fall precautions   Continue with restorative

## 2023-11-07 ENCOUNTER — NURSING HOME VISIT (OUTPATIENT)
Dept: GERIATRICS | Facility: OTHER | Age: 73
End: 2023-11-07
Payer: COMMERCIAL

## 2023-11-07 DIAGNOSIS — R18.8 OTHER ASCITES: ICD-10-CM

## 2023-11-07 DIAGNOSIS — D64.9 ANEMIA, UNSPECIFIED TYPE: ICD-10-CM

## 2023-11-07 DIAGNOSIS — N13.9 OBSTRUCTIVE UROPATHY: ICD-10-CM

## 2023-11-07 DIAGNOSIS — R60.1 ANASARCA: ICD-10-CM

## 2023-11-07 DIAGNOSIS — N18.9 ACUTE KIDNEY INJURY SUPERIMPOSED ON CKD: Primary | ICD-10-CM

## 2023-11-07 DIAGNOSIS — N17.9 ACUTE KIDNEY INJURY SUPERIMPOSED ON CKD: Primary | ICD-10-CM

## 2023-11-07 PROBLEM — R60.9 EDEMA: Status: ACTIVE | Noted: 2023-10-13

## 2023-11-07 PROCEDURE — 99309 SBSQ NF CARE MODERATE MDM 30: CPT | Performed by: INTERNAL MEDICINE

## 2023-11-07 NOTE — ASSESSMENT & PLAN NOTE
Scheduled for paracentesis this week  Continue current meds  Continue monitoring symptoms   Cause still not know - has been elevated by GI, cardiology and nephrology

## 2023-11-07 NOTE — ASSESSMENT & PLAN NOTE
Seen by urology   Urology recommended suprapubic cath patient and wife are considering but have not made any choice yet  Continue with current meds   Continue follow up with urology

## 2023-11-07 NOTE — ASSESSMENT & PLAN NOTE
Now on increased dose of lasix at 40mg po daily along with aldactone   Continue monitoring symptoms  Continue follow up with GI and nephrology   Continue monitoring kidney function also
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
## 2023-11-07 NOTE — ASSESSMENT & PLAN NOTE
Cr stable around 2 last one done 10/26/23  Continue monitoring kidney function  Followed by nephrology

## 2023-12-06 ENCOUNTER — NURSING HOME VISIT (OUTPATIENT)
Dept: GERIATRICS | Facility: OTHER | Age: 73
End: 2023-12-06
Payer: COMMERCIAL

## 2023-12-06 DIAGNOSIS — R26.2 AMBULATORY DYSFUNCTION: ICD-10-CM

## 2023-12-06 DIAGNOSIS — K59.00 CONSTIPATION, UNSPECIFIED CONSTIPATION TYPE: ICD-10-CM

## 2023-12-06 DIAGNOSIS — N18.4 STAGE 4 CHRONIC KIDNEY DISEASE (HCC): ICD-10-CM

## 2023-12-06 DIAGNOSIS — I48.0 PAROXYSMAL ATRIAL FIBRILLATION (HCC): Primary | ICD-10-CM

## 2023-12-06 PROCEDURE — 99309 SBSQ NF CARE MODERATE MDM 30: CPT | Performed by: INTERNAL MEDICINE

## 2023-12-06 RX ORDER — GABAPENTIN 300 MG/1
300 CAPSULE ORAL 3 TIMES DAILY
COMMUNITY
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
## 2023-12-06 NOTE — ASSESSMENT & PLAN NOTE
Continue with safety measures  Continues with PT  Continue with fall precautions  Discussed with therapist about patient

## 2023-12-14 ENCOUNTER — TELEPHONE (OUTPATIENT)
Age: 73
End: 2023-12-14

## 2023-12-14 DIAGNOSIS — G62.9 POLYNEUROPATHY: Primary | ICD-10-CM

## 2023-12-14 RX ORDER — TRAMADOL HYDROCHLORIDE 50 MG/1
25 TABLET ORAL DAILY PRN
Qty: 15 TABLET | Refills: 3 | Status: SHIPPED | OUTPATIENT
Start: 2023-12-14 | End: 2023-12-15 | Stop reason: SDUPTHER

## 2023-12-14 RX ORDER — TRAMADOL HYDROCHLORIDE 50 MG/1
25 TABLET ORAL DAILY PRN
COMMUNITY
End: 2023-12-14 | Stop reason: SDUPTHER

## 2023-12-14 NOTE — TELEPHONE ENCOUNTER
Adria from [employer] called and informed that they are no longer dispense this medicine.    Tab Tramadol 50mg

## 2023-12-15 DIAGNOSIS — G62.9 POLYNEUROPATHY: ICD-10-CM

## 2023-12-15 RX ORDER — TRAMADOL HYDROCHLORIDE 50 MG/1
25 TABLET ORAL DAILY PRN
Qty: 30 TABLET | Refills: 3 | Status: SHIPPED | OUTPATIENT
Start: 2023-12-15

## 2023-12-19 ENCOUNTER — NURSING HOME VISIT (OUTPATIENT)
Dept: GERIATRICS | Facility: OTHER | Age: 73
End: 2023-12-19
Payer: COMMERCIAL

## 2023-12-19 DIAGNOSIS — N18.4 STAGE 4 CHRONIC KIDNEY DISEASE (HCC): ICD-10-CM

## 2023-12-19 DIAGNOSIS — I12.9 HYPERTENSIVE CHRONIC KIDNEY DISEASE WITH STAGE 1 THROUGH STAGE 4 CHRONIC KIDNEY DISEASE, OR UNSPECIFIED CHRONIC KIDNEY DISEASE: ICD-10-CM

## 2023-12-19 DIAGNOSIS — H25.043 POSTERIOR SUBCAPSULAR POLAR AGE-RELATED CATARACT OF BOTH EYES: Primary | ICD-10-CM

## 2023-12-19 PROCEDURE — 99309 SBSQ NF CARE MODERATE MDM 30: CPT | Performed by: NURSE PRACTITIONER

## 2023-12-19 RX ORDER — LACTOBACILLUS ACIDOPHILUS 500MM CELL
1 CAPSULE ORAL 3 TIMES DAILY
COMMUNITY

## 2023-12-19 NOTE — ASSESSMENT & PLAN NOTE
Most recent renal functions at baseline (12/11/2023): Crea: 2.08/ BUN: 35/ eGFR: 33  Per Neph note fluctuating baseline Crea due to diuretics  On daily diuretics  Followed and managed by [facility] Nephrology office. Last seen on Nov, 2023.  Continue to avoid hypotensive episodes and use of nephrotoxic medication  Continue with Sodium bicarbonate 1,300mg BID

## 2023-12-19 NOTE — ASSESSMENT & PLAN NOTE
Ave BP (Nov-Dec, 2023): 120-125  Ave HR (Nov-Dec, 2023): 65-70/min  On diuretics: Spironolactone 25mg daily/ Furosemide 20mg daily

## 2023-12-19 NOTE — ASSESSMENT & PLAN NOTE
Last Platelet count (10/26/2023): 101 (L)  No report of bleeding in LTC  Not on antiplatelet or anticoagulation

## 2023-12-19 NOTE — ASSESSMENT & PLAN NOTE
Followed and managed by [facility] Ophthalmology office, Last seen on 12/14/2023.  Continue pre-surgery eye drops as instructed  Scheduled for cataract surgery: 12/29/23 & 1/11/2024  Patient is low surgical risk score for cataract surgery.

## 2023-12-19 NOTE — PROGRESS NOTES
[address], [address], [place] [address]  [phone]    NAME: Ivan Ruelas  AGE: 73 y.o. SEX: male    Progress Note    Location: [facility])  POS: 32    Assessment/Plan:    Posterior subcapsular polar age-related cataract of both eyes  Followed and managed by [facility] Ophthalmology office, Last seen on 12/14/2023.  Continue pre-surgery eye drops as instructed  Scheduled for cataract surgery: 12/29/23 & 1/11/2024  Patient is low surgical risk score for cataract surgery.     Type 2 diabetes mellitus with diabetic nephropathy ([facility])    Lab Results   Component Value Date    HGBA1C 4.8 05/05/2023   FBG range (Nov-Dec, 2023) = 97 to 129  Diet controlled.    Hypertensive chronic kidney disease with stage 1 through stage 4 chronic kidney disease, or unspecified chronic kidney disease  Ave BP (Nov-Dec, 2023): 120-125  Ave HR (Nov-Dec, 2023): 65-70/min  On diuretics: Spironolactone 25mg daily/ Furosemide 20mg daily    Stage 4 chronic kidney disease ([facility])  Most recent renal functions at baseline (12/11/2023): Crea: 2.08/ BUN: 35/ eGFR: 33  Per Neph note fluctuating baseline Crea due to diuretics  On daily diuretics  Followed and managed by [facility] Nephrology office. Last seen on Nov, 2023.  Continue to avoid hypotensive episodes and use of nephrotoxic medication  Continue with Sodium bicarbonate 1,300mg BID    Thrombocytopenia ([facility])  Last Platelet count (10/26/2023): 101 (L)  No report of bleeding in LTC  Not on antiplatelet or anticoagulation    Chief complaint / Reason for visit: Medical Clearance    History of Present Illness:  This is a 73-year-old male patient residing at [facility].  Patient is seen and examined today for a pre-procedure medical clearance for Cataract surgery schedule don 12/29/ 2023 and 1 11/2023 at [facility].  Patient is out of bed for this visit -alert, cooperative, calm, pleasant and not in distress.  Patient is verbally engage with clear coherent speech  -oriented to name/birthday/current date and place.  Patient acknowledged feeling well on this visit -denies any acute medical concerns during ROS assessment including pain.  Nursing does not have any acute medical concerns for this visit.    Nursing and prior provider notes reviewed on this visit. Discussed visit with PCP and nursing staff/ supervisor.    Review of Systems:  Per history of present illness, all other systems reviewed and negative.    HISTORY:  Medical Hx: Reviewed, unchanged  Family Hx: Reviewed, unchanged  Soc Hx: Reviewed,  unchanged    ALLERGY: Reviewed, unchanged. No Known Allergies     PHYSICAL EXAM:  Vital Signs: T98.5F -HR72 -R16 -BP: 132/90 (12/15/2023) -SpO2 96% RA  Weight 205.5 lbs (12/12/2023) <= 186.9 lbs (11/4/2023) <= 183.2 lbs (10/4/2023)    General: NAD. Well appearing. No acute distress  Head: Atraumatic. Normocephalic.  Eye Exam: anicteric sclera, no discharge, PERRLA, No injection  Oral Exam: moist mucous membranes, no buccaloropharyngeal erythema, palatine tonsils WNL.  Neck Exam: no anterior cervical lymphadenopathy noted, neck supple. Trachea midline, no carotid bruit, no masses  Cardiovascular: regular rate, regular rhythm, no: murmurs/ rubs/ gallops. S1 and S2 appreciated.  Pulmonary: no wheeze, no rhonchi, no rales. No chest tenderness. Normal chest wall expansion  Abdominal: soft, non-tender, nondistended, bowel sounds audible x 4 quadrants. No palpable hepatosplenomegaly, no tympany.  : Non distended bladder. Continent.  Extremities and skin: Trace B/L LE edema - TEDS stocking in plce, no rashes. Intact skin  Neurological: alert, cooperative and responsive, Oriented x 3. No tics, normal sensation to pressure and light touch.  moving all 4 extremities symmetrically. Ambulatory dysfunction - uses manual wheelchair and/or walker.    Laboratory / Imaging results reviewed.    Current Medications: All medications reviewed and updated in Nursing Home eMAR.    Please note: This  "note was completed in part utilizing a voice-recognition software may have been used in the preparation of this document. Grammatical errors, random word insertion, spelling mistakes, and incomplete sentences may be an occasional consequence of the system secondary to software limitations, ambient noise and hardware issues. Occasional wrong word or \"sound-alike\" substitutions may have occurred due to the inherent limitations of voice recognition software. At the time of dictation, efforts were made to edit, clarify and/or correct errors. Interpretation should be guided by context. Please read the chart carefully and recognize, using context, where substitutions have occurred. If you have any questions or concerns about the context, text or information contained within the body of this dictation, please contact myself, the provider, for further clarification.      JORDAN Del Real  12/19/2023  "

## 2023-12-19 NOTE — ASSESSMENT & PLAN NOTE
Lab Results   Component Value Date    HGBA1C 4.8 05/05/2023   FBG range (Nov-Dec, 2023) = 97 to 129  Diet controlled.

## 2024-01-05 ENCOUNTER — NURSING HOME VISIT (OUTPATIENT)
Dept: GERIATRICS | Facility: OTHER | Age: 74
End: 2024-01-05
Payer: COMMERCIAL

## 2024-01-05 DIAGNOSIS — E11.21 TYPE 2 DIABETES MELLITUS WITH DIABETIC NEPHROPATHY, WITHOUT LONG-TERM CURRENT USE OF INSULIN (HCC): ICD-10-CM

## 2024-01-05 DIAGNOSIS — A43.9 NOCARDIOSIS: ICD-10-CM

## 2024-01-05 DIAGNOSIS — I12.9 HYPERTENSIVE CHRONIC KIDNEY DISEASE WITH STAGE 1 THROUGH STAGE 4 CHRONIC KIDNEY DISEASE, OR UNSPECIFIED CHRONIC KIDNEY DISEASE: Primary | ICD-10-CM

## 2024-01-05 DIAGNOSIS — N18.4 STAGE 4 CHRONIC KIDNEY DISEASE (HCC): ICD-10-CM

## 2024-01-05 DIAGNOSIS — N13.9 OBSTRUCTIVE UROPATHY: ICD-10-CM

## 2024-01-05 PROCEDURE — 99309 SBSQ NF CARE MODERATE MDM 30: CPT | Performed by: NURSE PRACTITIONER

## 2024-01-09 NOTE — PROGRESS NOTES
[facility]  [address] [place] [address], [address], [place] [address]  [phone]    NAME: Ivan Ruelas  AGE: 73 y.o. SEX: male    Progress Note    Location: [facility]  POS: 32    Assessment/Plan:    Hypertensive chronic kidney disease with stage 1 through stage 4 chronic kidney disease, or unspecified chronic kidney disease  Ave SBP (Nov-Dec, 2023) = 120-125  On diuretic therapy only: Aldactone 25mg daily/Furosemide 60mg daily  Renal function (12/11/2023): Crea: 2.08/ BUN: 35/ eGFR: 33  Followed and managed by [facility] Cardiology office.    Stage 4 chronic kidney disease (HCC)  Followed by [facility] Nephrology office as out-patient. Last seen on Nov, 2023:  = Baseline Crea: 1.4-1.9 but fluctuates due to diuretic  = Continue Allopurinol  Last renal function test (2/11/2023): Crea: 2.08/BUN: 35/ eGFR: 33  On diuretic therapy: Furosemide & Spironolactone  Continue Sodium bicarbonate 1,300mg BID  Continue to avoid hypotensive episodes or use of nephrotoxic medications    Type 2 diabetes mellitus with diabetic nephropathy (HCC)    Lab Results   Component Value Date    HGBA1C 4.8 05/05/2023   Goal: < 8%  Diet controlled.  Continue Gabapentin 300mg TID  = may need dose reduction if CrCl continues to decline  Check HbA1C 1/18/2024    Nocardiosis  Followed and managed by [facility] ID office as out-patient  Continue Bactrim 800-160mg daily    Obstructive uropathy  Followed by [facility] Urology office. Last seen on 1/4/2024:  = Failed TOV (trial of void) on this visit  = recommendation for SPT placement  = Urology office changes Bautista catheter.   Follow-up in 4 weeks  Patient denies any  or catheter related concerns on this visit  Continue Tamsulosin 0.4mg daily    Chief complaint / Reason for visit: Follow- visit    History of Present Illness:  This is a 73-year-old male patient residing at [facility].  Patient is seen and examined follow-up of chronic medical conditions.  Patient is out of bed for this visit  sitting in manual chair in room -alert, cooperative, calm, pleasant and not in distress.  Patient is verbal with clear coherent speech- oriented x 3.  Patient was feeling well this visit -  Presenting ROS assessment including pain.  Patient reported that he did very well after his cataract surgery -he reports that he is seeing better with the left eye.  Right eye cataract surgery scheduled on 1/11/2024.  Patient also expressed concern regarding his enlarging abdomen.  Patient feels it is harder.  Patient is being followed by Nephrology and GI office (Ascites).  Patient denies any other acute medical concerns at this visit.  Nursing reports no acute medical concerns for this visit as well.    Nursing and prior provider notes reviewed on this visit. Discussed visit with PCP and nursing staff/ supervisor.    Review of Systems:  Per history of present illness, all other systems reviewed and negative.    HISTORY:  Medical Hx: Reviewed, unchanged  Family Hx: Reviewed, unchanged  Soc Hx: Reviewed,  unchanged    ALLERGY: Reviewed, unchanged. No Known Allergies     PHYSICAL EXAM:  Vital Signs: T 97.0F -HR 56 -R 18 -BP: 115/60 -SpO2 98% RA  Weight: 206.3 lbs (1/3/2024) <= 205.5 lbs (12/12/2023) <= 186.9 lbs (11/4/2023)    General: NAD. Well appearing. No acute distress  Head: Atraumatic. Normocephalic.  Eye Exam: anicteric sclera, no discharge, PERRLA, No injection  Oral Exam: moist mucous membranes, no buccaloropharyngeal erythema, palatine tonsils WNL.  Neck Exam: no anterior cervical lymphadenopathy noted, neck supple. Trachea midline, no carotid bruit, no masses  Cardiovascular: regular rate, regular rhythm, no: murmurs/ rubs/ gallops. S1 and S2 appreciated.  Pulmonary: no wheeze, no rhonchi, no rales. No chest tenderness. Normal chest wall expansion  Abdominal: soft, non-tender, nondistended, bowel sounds audible x 4 quadrants. No palpable hepatosplenomegaly, no tympany  : Non distended bladder. Continent. Bautista  "catheter in place.  Extremities and skin: no edema noted, no rashes. Intact skin  Neurological: alert, cooperative and responsive, Oriented x 3. No tics, normal sensation to pressure and light touch. moving all 4 extremities symmetrically. Ambulatory dysfunction - uses manual wheelchair in LTC.    Laboratory / Imaging results reviewed.    Current Medications: All medications reviewed and updated in Nursing Home eMAR.    Please note: This note was completed in part utilizing a voice-recognition software may have been used in the preparation of this document. Grammatical errors, random word insertion, spelling mistakes, and incomplete sentences may be an occasional consequence of the system secondary to software limitations, ambient noise and hardware issues. Occasional wrong word or \"sound-alike\" substitutions may have occurred due to the inherent limitations of voice recognition software. At the time of dictation, efforts were made to edit, clarify and/or correct errors. Interpretation should be guided by context. Please read the chart carefully and recognize, using context, where substitutions have occurred. If you have any questions or concerns about the context, text or information contained within the body of this dictation, please contact myself, the provider, for further clarification.      JORDAN Del Real  1/9/2024  "

## 2024-01-09 NOTE — ASSESSMENT & PLAN NOTE
Ave SBP (Nov-Dec, 2023) = 120-125  On diuretic therapy only: Aldactone 25mg daily/Furosemide 60mg daily  Renal function (12/11/2023): Crea: 2.08/ BUN: 35/ eGFR: 33  Followed and managed by [facility] Cardiology office.

## 2024-01-09 NOTE — ASSESSMENT & PLAN NOTE
Lab Results   Component Value Date    HGBA1C 4.8 05/05/2023   Goal: < 8%  Diet controlled.  Continue Gabapentin 300mg TID  = may need dose reduction if CrCl continues to decline  Check HbA1C 1/18/2024

## 2024-01-09 NOTE — ASSESSMENT & PLAN NOTE
Followed and managed by [facility] ID office as out-patient  Continue Bactrim 800-160mg daily

## 2024-01-09 NOTE — ASSESSMENT & PLAN NOTE
Followed by [facility] Nephrology office as out-patient. Last seen on Nov, 2023:  = Baseline Crea: 1.4-1.9 but fluctuates due to diuretic  = Continue Allopurinol  Last renal function test (2/11/2023): Crea: 2.08/BUN: 35/ eGFR: 33  On diuretic therapy: Furosemide & Spironolactone  Continue Sodium bicarbonate 1,300mg BID  Continue to avoid hypotensive episodes or use of nephrotoxic medications

## 2024-01-09 NOTE — ASSESSMENT & PLAN NOTE
Followed by [facility] Urology office. Last seen on 1/4/2024:  = Failed TOV (trial of void) on this visit  = recommendation for SPT placement  = Urology office changes Bautista catheter.   Follow-up in 4 weeks  Patient denies any  or catheter related concerns on this visit  Continue Tamsulosin 0.4mg daily

## 2024-02-05 ENCOUNTER — NURSING HOME VISIT (OUTPATIENT)
Dept: GERIATRICS | Facility: OTHER | Age: 74
End: 2024-02-05
Payer: COMMERCIAL

## 2024-02-05 DIAGNOSIS — G62.9 POLYNEUROPATHY: ICD-10-CM

## 2024-02-05 DIAGNOSIS — R18.8 OTHER ASCITES: Primary | ICD-10-CM

## 2024-02-05 DIAGNOSIS — F32.1 CURRENT MODERATE EPISODE OF MAJOR DEPRESSIVE DISORDER, UNSPECIFIED WHETHER RECURRENT (HCC): ICD-10-CM

## 2024-02-05 DIAGNOSIS — R60.1 ANASARCA: ICD-10-CM

## 2024-02-05 PROCEDURE — 99309 SBSQ NF CARE MODERATE MDM 30: CPT | Performed by: INTERNAL MEDICINE

## 2024-02-05 NOTE — ASSESSMENT & PLAN NOTE
Continues to report at times neuropathy symptoms is worst   Discussed about doing an xray of the back but did not want at present time   Continue current meds  Continue monitoring symptoms

## 2024-02-05 NOTE — ASSESSMENT & PLAN NOTE
Had paracentesis last week and removed 1L  Does have still some significant subcutaneous fluids  Continue monitoring symptoms

## 2024-02-05 NOTE — ASSESSMENT & PLAN NOTE
Continues on diuretics  Continues to have fluid build up  Followed by nephrology and GI  Continue monitoring kidney function   Unsure of exact cause of the ascites and anasarca possible due to cirrhosis
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
## 2024-02-05 NOTE — ASSESSMENT & PLAN NOTE
Upset about his current situation   And not being able to go home   Continue with current meds  Continue with supportive care  Discussed also with wife

## 2024-03-25 DIAGNOSIS — G62.9 POLYNEUROPATHY: ICD-10-CM

## 2024-03-25 RX ORDER — TRAMADOL HYDROCHLORIDE 50 MG/1
25 TABLET ORAL DAILY PRN
Qty: 30 TABLET | Refills: 3 | Status: SHIPPED | OUTPATIENT
Start: 2024-03-25

## 2024-03-30 ENCOUNTER — TELEPHONE (OUTPATIENT)
Dept: OTHER | Facility: OTHER | Age: 74
End: 2024-03-30

## 2024-03-30 NOTE — TELEPHONE ENCOUNTER
"Subjective   Beatrice Owens is a 2 m.o. female who is brought in for this well child visit.  Birth History   • Birth     Length: 48 cm     Weight: 2.97 kg     HC 33.5 cm   • Apgar     One: 9     Five: 9   • Discharge Weight: 2.829 kg   • Delivery Method: Vaginal, Spontaneous   • Gestation Age: 37 3/7 wks   • Days in Hospital: 4.0     Immunization History   Administered Date(s) Administered   • Hepatitis B vaccine, pediatric/adolescent (RECOMBIVAX, ENGERIX) 2024     The following portions of the patient's history were reviewed by a provider in this encounter and updated as appropriate:       Well Child 2 Month    Objective   Growth parameters are noted and {are:27225::\"are\"} appropriate for age.  Physical Exam     Assessment/Plan   Healthy 2 m.o. female infant.  1. Anticipatory guidance discussed.  {guidance:07158}  2. Screening tests:   a. State  metabolic screen: {neg/pos:27049}  b. Hearing screen (OAE, ABR): {neg/pos:91152}  3. Ultrasound of the hips to screen for developmental dysplasia of the hip: {yes/no:63::not applicable}  4. Development: {desc; development appropriate/delayed:22166::\"appropriate for age\"}  5. Immunizations today: per orders.  History of previous adverse reactions to immunizations? {yes***/no:13836::no}  6. Follow-up visit in {1-6:39602::2} {time; units:06565::months} for next well child visit, or sooner as needed.  " [phone]/[facility]/Artur Ruelas/TOY 1950. Pt fell and experiencing back pain but refusing to be seen for treatment. Antoni is requesting a call back.    VIA tc

## 2024-04-04 ENCOUNTER — NURSING HOME VISIT (OUTPATIENT)
Dept: GERIATRICS | Facility: OTHER | Age: 74
End: 2024-04-04
Payer: COMMERCIAL

## 2024-04-04 DIAGNOSIS — I12.9 HYPERTENSIVE CHRONIC KIDNEY DISEASE WITH STAGE 1 THROUGH STAGE 4 CHRONIC KIDNEY DISEASE, OR UNSPECIFIED CHRONIC KIDNEY DISEASE: ICD-10-CM

## 2024-04-04 DIAGNOSIS — I48.0 PAROXYSMAL ATRIAL FIBRILLATION (HCC): ICD-10-CM

## 2024-04-04 DIAGNOSIS — E87.1 HYPONATREMIA: ICD-10-CM

## 2024-04-04 DIAGNOSIS — R18.8 OTHER ASCITES: ICD-10-CM

## 2024-04-04 DIAGNOSIS — R26.2 AMBULATORY DYSFUNCTION: ICD-10-CM

## 2024-04-04 DIAGNOSIS — K59.00 CONSTIPATION, UNSPECIFIED CONSTIPATION TYPE: ICD-10-CM

## 2024-04-04 DIAGNOSIS — N13.9 OBSTRUCTIVE UROPATHY: ICD-10-CM

## 2024-04-04 DIAGNOSIS — S32.010A CLOSED COMPRESSION FRACTURE OF BODY OF L1 VERTEBRA (HCC): Primary | ICD-10-CM

## 2024-04-04 PROCEDURE — 99306 1ST NF CARE HIGH MDM 50: CPT | Performed by: INTERNAL MEDICINE

## 2024-04-04 RX ORDER — METHOCARBAMOL 500 MG/1
500 TABLET, FILM COATED ORAL EVERY 6 HOURS PRN
COMMUNITY
Start: 2024-04-02

## 2024-04-04 RX ORDER — OXYCODONE HYDROCHLORIDE 5 MG/1
5 TABLET ORAL EVERY 4 HOURS PRN
COMMUNITY
Start: 2024-04-02

## 2024-04-04 RX ORDER — AMOXICILLIN 500 MG/1
2000 CAPSULE ORAL
COMMUNITY
Start: 2024-03-25

## 2024-04-04 NOTE — ASSESSMENT & PLAN NOTE
Lab Results   Component Value Date    EGFR 32 (L) 03/31/2024    EGFR 32 (L) 03/30/2024    EGFR 30 (L) 02/21/2024    CREATININE 2.13 (H) 03/31/2024    CREATININE 2.13 (H) 03/30/2024    CREATININE 2.26 (H) 02/21/2024     Stable  Continue current meds  Continue follow up with nephrology  Continue with repeat labs monitoring

## 2024-04-04 NOTE — ASSESSMENT & PLAN NOTE
Continue with pain control  Continue with activity as tolerable  Continue with safety measures and fall precautions  Continue with TLSO brace prn   Seen by neurosurgery and recommended non surgical management

## 2024-04-04 NOTE — ASSESSMENT & PLAN NOTE
No bowel movement for several days as per report in the hospital  Continue current meds   Continue with bowel protocol
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
## 2024-04-04 NOTE — ASSESSMENT & PLAN NOTE
Multifactorial  Continue with safety measures  Continue with fall precautions  Continue with therapy as tolerable

## 2024-04-09 DIAGNOSIS — Z79.2 LONG TERM (CURRENT) USE OF ANTIBIOTICS: Primary | ICD-10-CM

## 2024-04-09 DIAGNOSIS — S32.010A CLOSED COMPRESSION FRACTURE OF BODY OF L1 VERTEBRA (HCC): ICD-10-CM

## 2024-04-09 RX ORDER — TRAMADOL HYDROCHLORIDE 50 MG/1
50 TABLET ORAL EVERY 8 HOURS PRN
Qty: 30 TABLET | Refills: 0 | Status: SHIPPED | OUTPATIENT
Start: 2024-04-09

## 2024-05-09 ENCOUNTER — NURSING HOME VISIT (OUTPATIENT)
Dept: GERIATRICS | Facility: OTHER | Age: 74
End: 2024-05-09
Payer: COMMERCIAL

## 2024-05-09 DIAGNOSIS — E11.21 TYPE 2 DIABETES MELLITUS WITH DIABETIC NEPHROPATHY, WITHOUT LONG-TERM CURRENT USE OF INSULIN (HCC): ICD-10-CM

## 2024-05-09 DIAGNOSIS — N18.4 STAGE 4 CHRONIC KIDNEY DISEASE (HCC): Primary | ICD-10-CM

## 2024-05-09 DIAGNOSIS — I12.9 HYPERTENSIVE CHRONIC KIDNEY DISEASE WITH STAGE 1 THROUGH STAGE 4 CHRONIC KIDNEY DISEASE, OR UNSPECIFIED CHRONIC KIDNEY DISEASE: ICD-10-CM

## 2024-05-09 DIAGNOSIS — I48.0 PAROXYSMAL ATRIAL FIBRILLATION (HCC): ICD-10-CM

## 2024-05-09 DIAGNOSIS — A43.9 NOCARDIOSIS: ICD-10-CM

## 2024-05-09 DIAGNOSIS — N13.9 OBSTRUCTIVE UROPATHY: ICD-10-CM

## 2024-05-09 PROCEDURE — 99309 SBSQ NF CARE MODERATE MDM 30: CPT | Performed by: NURSE PRACTITIONER

## 2024-05-15 NOTE — PROGRESS NOTES
[address], [address], [place] [address]  [phone]    NAME: Ivan Ruelas  AGE: 73 y.o. SEX: male    Progress Note    Location: [facility]  POS: 32    Assessment/Plan:    Stage 4 chronic kidney disease (HCC)  Lab Results   Component Value Date    EGFR 34 (L) 05/13/2024    EGFR 34 (L) 04/12/2024    EGFR 34 (L) 04/10/2024    CREATININE 2.03 (H) 05/13/2024    CREATININE 2.05 (H) 04/12/2024    CREATININE 2.06 (H) 04/10/2024   Followed by [facility] Nephrology office as out-patient. Last seen on 5/8/2024. Per note:  - deemed renal function as stable  - continue low Sodium diet  - continue Bactrim  - avoid NSAIDs nad routine IV contrast  - Needs tight control of sugar  - NO objection to increase Furosemide as needed.  - Baseline Crea: 1.4-1.9 but fluctuates to 1.25-2.0 due to diuretics.  Last renal function (4/12/2024): Crea: 2.05/ BUN: 48/ eGFR: 34  On diuretic therapy: Furosemide & Spironolactone  Continue the following meds:  * Aldactone 25mg daily  * Allopurinol 50mg daily  * Furosemide 80mg daily  * Sodium bicarbonate 1,300mg BID  BMP scheduled opon 5/13/2024    Hypertensive chronic kidney disease with stage 1 through stage 4 chronic kidney disease, or unspecified chronic kidney disease  Lab Results   Component Value Date    EGFR 34 (L) 05/13/2024    EGFR 34 (L) 04/12/2024    EGFR 34 (L) 04/10/2024    CREATININE 2.03 (H) 05/13/2024    CREATININE 2.05 (H) 04/12/2024    CREATININE 2.06 (H) 04/10/2024   SBP goal: approximately 120/mmHg (per Nephrology)  BP range (April, 2024) = 108/62 to 135/86  ** 5 episodes of SBP > 120 on this period   BP today: 127/66  Continue: Aldactone 25mg daily/Furosemide 80mg daily  Renal function (4/12/2024): Crea: 2.05/ BUN: 48/ eGFR: 34  K level (4/12/2024): 5.2  Na level (4/12/2024): 131 (chronic hyponatremia)  Next BMP on 5/13/2024     Type 2 diabetes mellitus with diabetic nephropathy (HCC)    Lab Results   Component Value Date    HGBA1C 8.2 (H)  05/13/2024   Goal: < 8%  Trending up HbA1C (4/12/2024): 8.2 (H) <= 6.7 (H: 1/18/2024) <= 4.8 (5/5/2023)  Diet controlled.  Start BG checks TID (before meals)  Plant to start insulin - will need to discuss with patient/ family prior to starting  Continue Gabapentin 300mg TID (Neuropathy)  Repeat HbA1C in 3 months    Nocardiosis  Continue Bactrim DS [800mg-160mg] daily    Obstructive uropathy  Patient denies any  or catheter related concerns on this visit  Followed by [facility] Urology office. Last seen on 4/5/2024:   = with Chronic suprapubic catheter: #FR 18 with 10cc balloon  = changed on this visit  = Follow-up in 4 weeks  Continue Tamsulosin 0.4mg daily    Paroxysmal atrial fibrillation (HCC)  Hx of PVI (3/3/2022)  BP range (April, 2024) = 108/62 to 135/86  HR range (April, 2024) = 60 to 79/min  Not on BB or anti-coagulation    Chief complaint / Reason for visit:  Follow- visit    History of Present Illness:  This is a 73-year-old male patient residing at [facility].  Patient is seen and examined today to follow-up any acute and chronic medical conditions.    Nursing and prior provider notes reviewed on this visit. Discussed visit with PCP and nursing staff/ supervisor.    Review of Systems:  Per history of present illness, all other systems reviewed and negative.    HISTORY:  Medical Hx: Reviewed, unchanged  Family Hx: Reviewed, unchanged  Soc Hx: Reviewed,  unchanged    ALLERGY: Reviewed, unchanged. No Known Allergies     PHYSICAL EXAM:  Vital Signs: T97.7F -HR 79 -R 18 -BP: 127/66 -SpO2: 96% RA  Weight: 208.7 lbs (5/9/2024) <= 212.7 lbs (5/3/2024) <=  209.6 lbs (4/6/204) <= 206.4 lbs (3/1/2024)    General: NAD. Well appearing. No acute distress  Head: Atraumatic. Normocephalic.  Eye Exam: anicteric sclera, no discharge, PERRLA, No injection  Oral Exam: moist mucous membranes, no buccaloropharyngeal erythema, palatine tonsils WNL.  Neck Exam: no anterior cervical lymphadenopathy noted, neck supple.  "Trachea midline, no carotid bruit, no masses  Cardiovascular: regular rate, regular rhythm, no: murmurs/ rubs/ gallops. S1 and S2 appreciated.  Pulmonary: no wheeze, no rhonchi, no rales. No chest tenderness. Normal chest wall expansion  Abdominal: soft, non-tender, chronic moderately distended abdomen, bowel sounds audible x 4 quadrants. No palpable hepatosplenomegaly, no tympany  : Non distended bladder. SP catheter in place.  Extremities and skin: no edema noted, no rashes. Intact skin  Neurological: alert, cooperative and responsive, Oriented x 3. No tics, normal sensation to pressure and light touch.Uses wheelchair in LTC.    Laboratory / Imaging results reviewed. Last labs done on April, 2024    Current Medications: All medications reviewed and updated in Nursing Home eMAR.    Please note: This note was completed in part utilizing a voice-recognition software may have been used in the preparation of this document. Grammatical errors, random word insertion, spelling mistakes, and incomplete sentences may be an occasional consequence of the system secondary to software limitations, ambient noise and hardware issues. Occasional wrong word or \"sound-alike\" substitutions may have occurred due to the inherent limitations of voice recognition software. At the time of dictation, efforts were made to edit, clarify and/or correct errors. Interpretation should be guided by context. Please read the chart carefully and recognize, using context, where substitutions have occurred. If you have any questions or concerns about the context, text or information contained within the body of this dictation, please contact myself, the provider, for further clarification.      JORDAN Del Real  5/15/2024  "

## 2024-05-15 NOTE — ASSESSMENT & PLAN NOTE
Lab Results   Component Value Date    EGFR 34 (L) 05/13/2024    EGFR 34 (L) 04/12/2024    EGFR 34 (L) 04/10/2024    CREATININE 2.03 (H) 05/13/2024    CREATININE 2.05 (H) 04/12/2024    CREATININE 2.06 (H) 04/10/2024   Followed by [facility] Nephrology office as out-patient. Last seen on 5/8/2024. Per note:  - deemed renal function as stable  - continue low Sodium diet  - continue Bactrim  - avoid NSAIDs nad routine IV contrast  - Needs tight control of sugar  - NO objection to increase Furosemide as needed.  - Baseline Crea: 1.4-1.9 but fluctuates to 1.25-2.0 due to diuretics.  Last renal function (4/12/2024): Crea: 2.05/ BUN: 48/ eGFR: 34  On diuretic therapy: Furosemide & Spironolactone  Continue the following meds:  * Aldactone 25mg daily  * Allopurinol 50mg daily  * Furosemide 80mg daily  * Sodium bicarbonate 1,300mg BID  BMP scheduled opon 5/13/2024

## 2024-05-15 NOTE — ASSESSMENT & PLAN NOTE
Lab Results   Component Value Date    HGBA1C 8.2 (H) 05/13/2024   Goal: < 8%  Trending up HbA1C (4/12/2024): 8.2 (H) <= 6.7 (H: 1/18/2024) <= 4.8 (5/5/2023)  Diet controlled.  Start BG checks TID (before meals)  Plant to start insulin - will need to discuss with patient/ family prior to starting  Continue Gabapentin 300mg TID (Neuropathy)  Repeat HbA1C in 3 months

## 2024-05-15 NOTE — ASSESSMENT & PLAN NOTE
Patient denies any  or catheter related concerns on this visit  Followed by [facility] Urology office. Last seen on 4/5/2024:   = with Chronic suprapubic catheter: #FR 18 with 10cc balloon  = changed on this visit  = Follow-up in 4 weeks  Continue Tamsulosin 0.4mg daily

## 2024-05-15 NOTE — ASSESSMENT & PLAN NOTE
Hx of PVI (3/3/2022)  BP range (April, 2024) = 108/62 to 135/86  HR range (April, 2024) = 60 to 79/min  Not on BB or anti-coagulation

## 2024-06-05 ENCOUNTER — NURSING HOME VISIT (OUTPATIENT)
Dept: GERIATRICS | Facility: OTHER | Age: 74
End: 2024-06-05
Payer: COMMERCIAL

## 2024-06-05 DIAGNOSIS — I10 ESSENTIAL (PRIMARY) HYPERTENSION: ICD-10-CM

## 2024-06-05 DIAGNOSIS — R26.2 AMBULATORY DYSFUNCTION: ICD-10-CM

## 2024-06-05 DIAGNOSIS — F32.1 CURRENT MODERATE EPISODE OF MAJOR DEPRESSIVE DISORDER, UNSPECIFIED WHETHER RECURRENT (HCC): ICD-10-CM

## 2024-06-05 DIAGNOSIS — E11.21 TYPE 2 DIABETES MELLITUS WITH DIABETIC NEPHROPATHY, WITHOUT LONG-TERM CURRENT USE OF INSULIN (HCC): Primary | ICD-10-CM

## 2024-06-05 PROCEDURE — 99309 SBSQ NF CARE MODERATE MDM 30: CPT | Performed by: INTERNAL MEDICINE

## 2024-06-05 RX ORDER — SODIUM BICARBONATE 650 MG/1
1300 TABLET ORAL 2 TIMES DAILY
COMMUNITY

## 2024-06-05 RX ORDER — ACETAMINOPHEN 500 MG
1000 TABLET ORAL 3 TIMES DAILY
COMMUNITY

## 2024-06-05 RX ORDER — GLIPIZIDE 5 MG/1
2.5 TABLET ORAL
COMMUNITY

## 2024-06-05 NOTE — ASSESSMENT & PLAN NOTE
Lab Results   Component Value Date    HGBA1C 8.2 (H) 05/13/2024     Was started on glipizide 2.5 mg and has been increased to to BID   Did talked to wife before starting the glipizide and explained the will titrate up as needed   Blood sugars continues to improve   Will decreased blood sugar checks to twice a day at qhs and am   Will repeat hba1c  in July

## 2024-06-05 NOTE — ASSESSMENT & PLAN NOTE
BP reviewed from facility records, acceptable range with few elevated readings  Continue lasix and spironolactone  Continue monitoring BP

## 2024-06-05 NOTE — ASSESSMENT & PLAN NOTE
Multifactorial  Continues to work with restorative  Continue with safety measures   Continue with fall precautions
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
## 2024-06-07 ENCOUNTER — TELEPHONE (OUTPATIENT)
Dept: NEUROSURGERY | Facility: CLINIC | Age: 74
End: 2024-06-07

## 2024-06-07 NOTE — TELEPHONE ENCOUNTER
Appointment canceled for Ivan Ruelas ([id number])   Visit type: [facility]OS[facility]    6/14/2024 8:45 AM (15 minutes) with Gerson Abdalla MD in  NEUROSURG [facility]   6/14/2024 8:00 AM (45 minutes) with JORDAN Pereira in  NEUROSURG [facility]      Reason for cancellation: Canceled via [employer]      Patient comments: Based on MRI results physician referred Ray to another doctor.     Appointment canceled  (Newest Message First)  View All Conversations on this Encounter  Artur Jessenia  P Neurosurgical [place] [id number] hours ago (1:01 PM)     RN  Appointment canceled for Ivan Ruelas ([id number])  Visit type: [facility] NEUROSURG   6/14/2024 8:45 AM (15 minutes) with Gerson Abdalla MD in  NEUROSURG [facility]  6/14/2024 8:00 AM (45 minutes) with JORDAN Pereira in  NEUROSURG [facility]     Reason for cancellation: Canceled via [employer]     Patient comments: Based on MRI results physician referred Ray to another doctor.

## 2024-06-10 ENCOUNTER — TELEPHONE (OUTPATIENT)
Dept: OBGYN CLINIC | Facility: HOSPITAL | Age: 74
End: 2024-06-10

## 2024-06-10 NOTE — TELEPHONE ENCOUNTER
Caller: Spouse (AMALIA)    PHONE# [phone]    Doctor/Office: Erickson    Call regarding :  Spine & pain     Call was transferred to: Transferred Spine & pain

## 2024-06-10 NOTE — TELEPHONE ENCOUNTER
Caller: Ivan Ortiz     Doctor: Martha     Reason for call: Patient wife will call back needs to confirm if transportation will bring  to consult scheduled.    Call back#: [phone]

## 2024-06-20 ENCOUNTER — TELEPHONE (OUTPATIENT)
Dept: PAIN MEDICINE | Facility: CLINIC | Age: 74
End: 2024-06-20

## 2024-06-20 NOTE — TELEPHONE ENCOUNTER
PT wife called in and was very upset about the pt appt getting cancelled. I advised her Dr. Loo does not treat compression fractures, she said someone should have told her before the appt was scheduled. She wanted to know if  Dr. Loo does not see pt with compression fractures why was the pt referred to us from Neuro. I tried to explain that it is totally up to the provider. Yue continued to become very upset, she said [facility] so she will just go to our competitors and she will not refer anyone to [facility] and disconnected the call.

## 2024-06-20 NOTE — TELEPHONE ENCOUNTER
Sandy Morrisiban  Do an addendum and copy and paste this into the encounter          Previous Messages       ----- Message -----  From: Ronnie Savage DO  Sent: 6/20/2024  10:07 AM EDT  To: Sandy Boateng  Subject: RE: Please Advise                                Refer to Jose L or Chloe-from neurosurgery  ----- Message -----  From: Sandy Boateng  Sent: 6/20/2024   9:49 AM EDT  To: Ronnie Savage DO  Subject: Please Advise                                    Patient is scheduled with you for 6-25-24 reason being  Compression Fracture L1. ORIGNALLY telephone encounter states [facility], but phone room schedule patient with us and wife needs to check about transportation because patient is in facility.    Patient WAS scheduled with Jose L but they canceled due to MRI results and notes state referred to another Dr.      Please advise.      Thank you      M for Yue to let her know that Dr Loo does not treat compression fractures.    Provider name and number for Dr Abdalla at Neurosurgery [phone]    6/25 consult cancelled

## 2024-07-03 ENCOUNTER — NURSING HOME VISIT (OUTPATIENT)
Dept: GERIATRICS | Facility: OTHER | Age: 74
End: 2024-07-03
Payer: COMMERCIAL

## 2024-07-03 DIAGNOSIS — E11.21 TYPE 2 DIABETES MELLITUS WITH DIABETIC NEPHROPATHY, UNSPECIFIED WHETHER LONG TERM INSULIN USE (HCC): ICD-10-CM

## 2024-07-03 DIAGNOSIS — I12.9 HYPERTENSIVE CHRONIC KIDNEY DISEASE WITH STAGE 1 THROUGH STAGE 4 CHRONIC KIDNEY DISEASE, OR UNSPECIFIED CHRONIC KIDNEY DISEASE: ICD-10-CM

## 2024-07-03 DIAGNOSIS — A43.9 NOCARDIOSIS: ICD-10-CM

## 2024-07-03 DIAGNOSIS — G62.9 POLYNEUROPATHY: ICD-10-CM

## 2024-07-03 DIAGNOSIS — N13.9 OBSTRUCTIVE UROPATHY: ICD-10-CM

## 2024-07-03 DIAGNOSIS — E78.2 MIXED HYPERLIPIDEMIA: ICD-10-CM

## 2024-07-03 DIAGNOSIS — N18.4 STAGE 4 CHRONIC KIDNEY DISEASE (HCC): Primary | ICD-10-CM

## 2024-07-03 PROCEDURE — 99309 SBSQ NF CARE MODERATE MDM 30: CPT | Performed by: NURSE PRACTITIONER

## 2024-07-11 NOTE — ASSESSMENT & PLAN NOTE
Lab Results   Component Value Date    EGFR 34 (L) 05/13/2024    EGFR 34 (L) 04/12/2024    EGFR 34 (L) 04/10/2024    CREATININE 2.03 (H) 05/13/2024    CREATININE 2.05 (H) 04/12/2024    CREATININE 2.06 (H) 04/10/2024   SBP goal: approx 120/mmHg (per Nephrology)  BP range (June/2024) = 117/73 to 150/88  ** 1 SBP > 144 on this period   ** multiple SBP> 120 on this period  Continue Aldactone 25mg daily/Furosemide 80mg daily  Renal function (5/13/2024): Crea: 2.03/ BUN: 40/ eGFR: 34  Not on Anti-HTN medication

## 2024-07-11 NOTE — ASSESSMENT & PLAN NOTE
Lab Results   Component Value Date    HGBA1C 8.2 (H) 05/13/2024   Goal: < 8%  HbA1C (5/13/2024): 8.2 (H) <= 6.7 (H: 1/18/2024) <= 4.8 (5/5/2023)  FBG range (6/1-30/2024) = 85 to 142  Pre-lunch BG range (6/1-5/2024) = 134 to 197   Pre-dinner BG range (6/1-5/2024) = 104 to 165  2100 BG range (6/1-30/2024) = 128 to 292  Continue Glipizide 2.5mg daily and Humalog SSI BID  Extensively discussed about healthier choices in food/meals/snacks and drinks to improve glycemic control.  Next HbA1C scheduled on 7/15/2024

## 2024-07-11 NOTE — ASSESSMENT & PLAN NOTE
Patient denies any  or catheter related concerns on this visit  On Chronic SP catheter use  Continue Tamsulosin 0.4mg daily  Followed by [facility] Urology office. Next appointment: [id number]

## 2024-07-11 NOTE — PROGRESS NOTES
[address], [address], [place] [address]  [phone]    NAME: Ivan Ruelas  AGE: 73 y.o. SEX: male    Progress Note    Location: [facility]  POS: 32    Assessment/Plan:    Stage 4 chronic kidney disease (HCC)  Lab Results   Component Value Date    EGFR 34 (L) 05/13/2024    EGFR 34 (L) 04/12/2024    EGFR 34 (L) 04/10/2024    CREATININE 2.03 (H) 05/13/2024    CREATININE 2.05 (H) 04/12/2024    CREATININE 2.06 (H) 04/10/2024   Baseline Crea: 1.4-1.9   - fluctuates to 1.25-2.0 due to diuretics.  Last renal function (5/13/2024): Crea: 2.03/ BUN: 40/ eGFR: 34  On diuretic therapy: Furosemide & Spironolactone  Continue the following meds:  * Aldactone 25mg daily  * Allopurinol 50mg daily  * Furosemide 80mg daily  * Sodium bicarbonate 1,300mg BID  Followed by [facility] Nephrology office as out-patient. Last seen on 5/8/2024.   Continue KHUSHBOO diett  Next Renal function test on 8/8/2024    Hypertensive chronic kidney disease with stage 1 through stage 4 chronic kidney disease, or unspecified chronic kidney disease  Lab Results   Component Value Date    EGFR 34 (L) 05/13/2024    EGFR 34 (L) 04/12/2024    EGFR 34 (L) 04/10/2024    CREATININE 2.03 (H) 05/13/2024    CREATININE 2.05 (H) 04/12/2024    CREATININE 2.06 (H) 04/10/2024   SBP goal: approx 120/mmHg (per Nephrology)  BP range (June/2024) = 117/73 to 150/88  ** 1 SBP > 144 on this period   ** multiple SBP> 120 on this period  Continue Aldactone 25mg daily/Furosemide 80mg daily  Renal function (5/13/2024): Crea: 2.03/ BUN: 40/ eGFR: 34  Not on Anti-HTN medication    Type 2 diabetes mellitus with diabetic nephropathy (HCC)    Lab Results   Component Value Date    HGBA1C 8.2 (H) 05/13/2024   Goal: < 8%  HbA1C (5/13/2024): 8.2 (H) <= 6.7 (H: 1/18/2024) <= 4.8 (5/5/2023)  FBG range (6/1-30/2024) = 85 to 142  Pre-lunch BG range (6/1-5/2024) = 134 to 197   Pre-dinner BG range (6/1-5/2024) = 104 to 165  2100 BG range (6/1-30/2024) = 128 to  "292  Continue Glipizide 2.5mg daily and Humalog SSI BID  Extensively discussed about healthier choices in food/meals/snacks and drinks to improve glycemic control.  Next HbA1C scheduled on 7/15/2024    Obstructive uropathy  Patient denies any  or catheter related concerns on this visit  On Chronic SP catheter use  Continue Tamsulosin 0.4mg daily  Followed by [facility] Urology office. Next appointment:     Nocardiosis  Continue Bactrim DS daily (prophylaxis)    Polyneuropathy  Continue Gabapentin 300mg TID (renally dosed)    Hyperlipidemia  Lipid test (2024): WNL except:  - Chol: 200 (H)  <= 204 (H: 2023)  - Tri (H)  <= 207 (H: 2023)  - Non HDL Chol: 170 (H)  <= 177 (H: 2023)  = LDL C: 92 (WNL)  <= 136 (H: 2023)   = HDL Chol: 30  (WNL)  <= 30 (WNL: 2023)  Not on statin/ anti-trig due likely due to renal impairment  Discussed about healthier food/snacks/drink choices today  Last Hepatic enzymes WNL (2024): Alk Phosh: 89/ AST: 13/ ALT: 16/ Tbili: 0.4  Plan: Start Atorvastatin 10mg daily      Chief complaint / Reason for visit:  Follow- visit    History of Present Illness:  This is a 73-year-old male patient residing at [facility].  Patient is seen and examined today to follow-up any acute and chronic medical conditions. Patient is OOB sitting in manual wheelchair in room - alert, cooperative, calm, pleasant and not in distress. Patient is verbal with clear coherent speech - oriented to name nad birthday only. Patient reported persistent \" shooting pains to feet - mostly bothering at night time\". Discussed current medication: Gabapentin. Patient believes that he is currently on Gabapentin 100mg TID but on review he is already maxed renallt dosed at Gabapentin 300mg TID. Discussed about limitations based on renal function - patient expressed understanding.Otherwise no other acute medical concerns for this visit. Nursing has no acute medical concerns for this visit. "     Nursing and prior provider notes reviewed on this visit. Discussed visit with PCP and nursing staff/ supervisor.    Review of Systems:  Per history of present illness, all other systems reviewed and negative.    HISTORY:  Medical Hx: Reviewed, unchanged  Family Hx: Reviewed, unchanged  Soc Hx: Reviewed,  unchanged    ALLERGY: Reviewed, unchanged. No Known Allergies     PHYSICAL EXAM:  Vital Signs: TT97.9F -P74 -R186 BP: 123/74 SpO2: 95% RA  Weight: 209.7 lbs (7/3/2024) <= 209.7 lbs (7/1/2024) <= 206.3 lbs (6/26/2024)    General: NAD. Well appearing. No acute distress  Head: Atraumatic. Normocephalic.  Eye Exam: anicteric sclera, no discharge, PERRLA, No injection  Oral Exam: moist mucous membranes, no buccaloropharyngeal erythema, palatine tonsils WNL.  Neck Exam: no anterior cervical lymphadenopathy noted, neck supple. Trachea midline, no carotid bruit, no masses  Cardiovascular: regular rate, regular rhythm, no: murmurs/ rubs/ gallops. S1 and S2 appreciated.  Pulmonary: no wheeze, no rhonchi, no rales. No chest tenderness. Normal chest wall expansion  Abdominal: soft, non-tender, slightly distended abdomen, bowel sounds audible x 4 quadrants. No palpable hepatosplenomegaly, no tympany  : Non distended bladder. SP catheter in place.  Extremities and skin: Trace B/L LE swelling - likely depenedent, no rashes. Intact skin  Neurological: alert, cooperative and responsive, Oriented x 2. No tics, normal sensation to pressure and light touch.  moving all 4 extremities symmetrically. Uses wheelchair.    Laboratory / Imaging results reviewed. Last labs done on 6/26/2024    Current Medications: All medications reviewed and updated in Nursing Home eMAR.    Please note: This note was completed in part utilizing a voice-recognition software may have been used in the preparation of this document. Grammatical errors, random word insertion, spelling mistakes, and incomplete sentences may be an occasional consequence of the  "system secondary to software limitations, ambient noise and hardware issues. Occasional wrong word or \"sound-alike\" substitutions may have occurred due to the inherent limitations of voice recognition software. At the time of dictation, efforts were made to edit, clarify and/or correct errors. Interpretation should be guided by context. Please read the chart carefully and recognize, using context, where substitutions have occurred. If you have any questions or concerns about the context, text or information contained within the body of this dictation, please contact myself, the provider, for further clarification.      JORDAN Del Real  7/11/2024  "

## 2024-07-11 NOTE — ASSESSMENT & PLAN NOTE
Lab Results   Component Value Date    EGFR 34 (L) 05/13/2024    EGFR 34 (L) 04/12/2024    EGFR 34 (L) 04/10/2024    CREATININE 2.03 (H) 05/13/2024    CREATININE 2.05 (H) 04/12/2024    CREATININE 2.06 (H) 04/10/2024   Baseline Crea: 1.4-1.9   - fluctuates to 1.25-2.0 due to diuretics.  Last renal function (5/13/2024): Crea: 2.03/ BUN: 40/ eGFR: 34  On diuretic therapy: Furosemide & Spironolactone  Continue the following meds:  * Aldactone 25mg daily  * Allopurinol 50mg daily  * Furosemide 80mg daily  * Sodium bicarbonate 1,300mg BID  Followed by [facility] Nephrology office as out-patient. Last seen on 5/8/2024.   Continue KHUSHBOO diett  Next Renal function test on 8/8/2024

## 2024-07-11 NOTE — ASSESSMENT & PLAN NOTE
Lipid test (2024): WNL except:  - Chol: 200 (H)  <= 204 (H: 2023)  - Tri (H)  <= 207 (H: 2023)  - Non HDL Chol: 170 (H)  <= 177 (H: 2023)  = LDL C: 92 (WNL)  <= 136 (H: 2023)   = HDL Chol: 30  (WNL)  <= 30 (WNL: 2023)  Not on statin/ anti-trig due likely due to renal impairment  Discussed about healthier food/snacks/drink choices today  Last Hepatic enzymes WNL (2024): Alk Phosh: 89/ AST: 13/ ALT: 16/ Tbili: 0.4  Plan: Start Atorvastatin 10mg daily

## 2024-07-15 PROBLEM — N17.9 AKI (ACUTE KIDNEY INJURY) (HCC): Status: RESOLVED | Noted: 2023-01-18 | Resolved: 2024-07-15

## 2024-07-15 PROBLEM — N18.9 ACUTE KIDNEY INJURY SUPERIMPOSED ON CKD  (HCC): Status: RESOLVED | Noted: 2023-01-18 | Resolved: 2024-07-15

## 2024-07-15 PROBLEM — N17.9 ACUTE KIDNEY INJURY SUPERIMPOSED ON CKD  (HCC): Status: RESOLVED | Noted: 2023-01-18 | Resolved: 2024-07-15

## 2024-07-16 PROBLEM — E11.65 TYPE 2 DIABETES MELLITUS WITH HYPERGLYCEMIA (HCC): Status: ACTIVE | Noted: 2024-07-16

## 2024-07-23 ENCOUNTER — TELEPHONE (OUTPATIENT)
Age: 74
End: 2024-07-23

## 2024-07-23 NOTE — TELEPHONE ENCOUNTER
"Patient's spouse, Yue, called to request patient's visit of 7/3/24 be re-coded to indicate \"Preventive Medicine\" visit.    This was the specific reason the visit was requested which the provider was aware of.  Insurance will not cover the visit as previously submitted.    Please change billing code to \"preventive visit\" and re-submit as soon as possible as insurance must receive prior to 7/31/24.    Any questions, please contact spouseYue.    "

## 2024-07-24 ENCOUNTER — NURSING HOME VISIT (OUTPATIENT)
Dept: GERIATRICS | Facility: OTHER | Age: 74
End: 2024-07-24
Payer: COMMERCIAL

## 2024-07-24 DIAGNOSIS — E11.40 TYPE 2 DIABETES MELLITUS WITH DIABETIC NEUROPATHY, UNSPECIFIED WHETHER LONG TERM INSULIN USE (HCC): Primary | ICD-10-CM

## 2024-07-24 DIAGNOSIS — R26.2 AMBULATORY DYSFUNCTION: ICD-10-CM

## 2024-07-24 DIAGNOSIS — G62.9 POLYNEUROPATHY: ICD-10-CM

## 2024-07-24 DIAGNOSIS — E11.40 TYPE 2 DIABETES MELLITUS WITH DIABETIC NEUROPATHY, UNSPECIFIED WHETHER LONG TERM INSULIN USE (HCC): ICD-10-CM

## 2024-07-24 DIAGNOSIS — N13.9 OBSTRUCTIVE UROPATHY: ICD-10-CM

## 2024-07-24 DIAGNOSIS — I48.0 PAROXYSMAL ATRIAL FIBRILLATION (HCC): ICD-10-CM

## 2024-07-24 DIAGNOSIS — D64.9 ANEMIA, UNSPECIFIED TYPE: ICD-10-CM

## 2024-07-24 DIAGNOSIS — N18.32 STAGE 3B CHRONIC KIDNEY DISEASE (HCC): ICD-10-CM

## 2024-07-24 DIAGNOSIS — E78.2 MIXED HYPERLIPIDEMIA: ICD-10-CM

## 2024-07-24 DIAGNOSIS — A43.9 NOCARDIOSIS: ICD-10-CM

## 2024-07-24 DIAGNOSIS — I12.9 HYPERTENSIVE CHRONIC KIDNEY DISEASE WITH STAGE 1 THROUGH STAGE 4 CHRONIC KIDNEY DISEASE, OR UNSPECIFIED CHRONIC KIDNEY DISEASE: ICD-10-CM

## 2024-07-24 DIAGNOSIS — Z00.00 PREVENTATIVE HEALTH CARE: Primary | ICD-10-CM

## 2024-07-24 PROCEDURE — 99309 SBSQ NF CARE MODERATE MDM 30: CPT | Performed by: NURSE PRACTITIONER

## 2024-07-24 RX ORDER — PREGABALIN 75 MG/1
75 CAPSULE ORAL 2 TIMES DAILY
COMMUNITY
End: 2024-07-24 | Stop reason: SDUPTHER

## 2024-07-24 RX ORDER — PREGABALIN 75 MG/1
75 CAPSULE ORAL 2 TIMES DAILY
Qty: 28 CAPSULE | Refills: 0 | Status: SHIPPED | OUTPATIENT
Start: 2024-07-24 | End: 2024-08-07

## 2024-07-24 NOTE — ASSESSMENT & PLAN NOTE
Lipid test (2024): WNL except:  - Chol: 200 (H)  <= 204 (H: 2023)  - Tri (H)  <= 207 (H: 2023)  - Non HDL Chol: 170 (H)  <= 177 (H: 2023)  = LDL C: 92 (WNL)  <= 136 (H: 2023)   = HDL Chol: 30  (WNL)  <= 30 (WNL: 2023)  Recently started on Atorvastatin 10mg daily on 2024.  Discussed about healthier food/snacks/drink choices today  Last Hepatic enzymes WNL (2024): Alk Phosh: 89/ AST: 13/ ALT: 16/ Tbili: 0.4  Repeat Lipid Panel in 1 year: 2025)
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
## 2024-07-24 NOTE — ASSESSMENT & PLAN NOTE
HR goal: < 100/min  Ave SBP: 122-125 (July, 2024)  HR range (July, 2024): 69 to 74//min  Not on Anti-coagulation or anti-HTN/ BB therapy  Patient does not seem to be followed by Cardiology office.

## 2024-07-24 NOTE — ASSESSMENT & PLAN NOTE
Multifactorial  Patient uses walker with assistance only  Patient is 1-2 person assistance for transfers with RW in/OOB with Right hand splint/ Right MAFO  Patient alternates use of manual wheelchair with rear anti-tippers and cushion  -Therapy group last fall assessment risk done: deemed low risk  Patient with right weakness (hemiparesis) documented  Continue fall precaution  Continue LTCF supportive care

## 2024-07-24 NOTE — ASSESSMENT & PLAN NOTE
Lab Results   Component Value Date    EGFR 34 (L) 05/13/2024    EGFR 34 (L) 04/12/2024    EGFR 34 (L) 04/10/2024    CREATININE 2.03 (H) 05/13/2024    CREATININE 2.05 (H) 04/12/2024    CREATININE 2.06 (H) 04/10/2024   SBP goal: ~ 120/mmHg (per Nephrology)  BP range (7/1-23/2024) = 108/60 to 144/67 (Ave SBP: 122-125 mmHg)  HR range (7/1-23/2024) = 69 to 74/min  Continue Aldactone 25mg daily/Furosemide 80mg daily  Not on Anti-HTN medication  Renal function (5/13/2024): Crea: 2.03/ BUN: 40/ eGFR: 34  If BP continues to trend up will start anti-hypertensive medication

## 2024-07-24 NOTE — ASSESSMENT & PLAN NOTE
Last Hbg/Hct (4/10/2024): 11.5/ 33.9 (L) / RBC: 3.50 (L)  Indices WNL (4/10/2024)  Continue MVI daily

## 2024-07-24 NOTE — ASSESSMENT & PLAN NOTE
Lab Results   Component Value Date    EGFR 34 (L) 05/13/2024    EGFR 34 (L) 04/12/2024    EGFR 34 (L) 04/10/2024    CREATININE 2.03 (H) 05/13/2024    CREATININE 2.05 (H) 04/12/2024    CREATININE 2.06 (H) 04/10/2024   Baseline Crea: 1.4-1.9 but fluctuates to 1.25-2.0 due to diuretic use.  Last renal function (5/13/2024): Crea: 2.03/ BUN: 40/ eGFR: 34  On dual diuretic therapy: Furosemide & Spironolactone  Continue the following meds:  * Aldactone 25mg daily  * Allopurinol 50mg daily  * Furosemide 80mg daily  * Sodium bicarbonate 1,300mg BID  Followed by [facility] Nephrology office as out-patient. Last seen on 5/8/2024:  - tight BP/ BG control: HbA1C goal: < 7%  - continue KHUSHBOO diet  - avoid NSAIDS and routine IV contrast.  - follow-up office visit on 9/12/2024  Next Renal function test on 8/8/2024

## 2024-07-24 NOTE — ASSESSMENT & PLAN NOTE
Patient denies any  or catheter related concerns on this visit  with Chronic Suprapubic catheter in place (2022)  Continue Tamsulosin 0.4mg daily  Followed by [facility] Urology office. Last seen on 7/11/2024 (Telemedicine). Note reviewed. NO new order.

## 2024-07-24 NOTE — ASSESSMENT & PLAN NOTE
Patient with complex but stable medical condition  Patient followed by specialists as out-patient   Patient followed and seen monthly with [facility]TC[facility] PCP  Labs current and reviewed with scheduled follow-up labs: improving values  Patient is currently a LTC[facility] resident at [facility]

## 2024-07-24 NOTE — ASSESSMENT & PLAN NOTE
Lab Results   Component Value Date    HGBA1C 6.6 (H) 07/15/2024   Improved HbA1C: 6.6 (H: 7/15/2024) <= 8.2 (H: 5/13/2024) <= 6.7 (H: 1/18/2024) <= 4.8 (WNL: 5/5/2023)  Goal: < 7% (Per Nephrology)  HbA1C (5/13/2024): 8.2 (H) <= 6.7 (H: 1/18/2024) <= 4.8 (5/5/2023)  FBG range (7/1-24/2024) =  105 to 134 / 85 to 142  2100 BG range (7/1-23/2024) = 140 to 319/ 28 to 292  Continue the following meds:  * Glipizide 2.5mg daily  * Humalog SSI BID fro  and higher  Discussed about healthier choices in food/meals/snacks and drinks to improve glycemic control.  Repeat HbA1C on 10/15/2024

## 2024-08-07 ENCOUNTER — TELEPHONE (OUTPATIENT)
Age: 74
End: 2024-08-07

## 2024-08-07 NOTE — TELEPHONE ENCOUNTER
Pt's wife is very frustrated can't get anywhere with a code for billing purposes. Can some please reach out to pt's wife to have this resolved.

## 2024-08-08 DIAGNOSIS — E11.40 TYPE 2 DIABETES MELLITUS WITH DIABETIC NEUROPATHY, UNSPECIFIED WHETHER LONG TERM INSULIN USE (HCC): Primary | ICD-10-CM

## 2024-08-08 RX ORDER — PREGABALIN 100 MG/1
100 CAPSULE ORAL 2 TIMES DAILY
COMMUNITY
End: 2024-08-08 | Stop reason: SDUPTHER

## 2024-08-08 RX ORDER — PREGABALIN 100 MG/1
100 CAPSULE ORAL 2 TIMES DAILY
Qty: 60 CAPSULE | Refills: 3 | Status: SHIPPED | OUTPATIENT
Start: 2024-08-08

## 2024-08-09 ENCOUNTER — NURSING HOME VISIT (OUTPATIENT)
Dept: GERIATRICS | Facility: OTHER | Age: 74
End: 2024-08-09
Payer: COMMERCIAL

## 2024-08-09 DIAGNOSIS — R42 DIZZINESS: ICD-10-CM

## 2024-08-09 DIAGNOSIS — M54.42 CHRONIC MIDLINE LOW BACK PAIN WITH BILATERAL SCIATICA: Primary | ICD-10-CM

## 2024-08-09 DIAGNOSIS — G89.29 CHRONIC MIDLINE LOW BACK PAIN WITH BILATERAL SCIATICA: Primary | ICD-10-CM

## 2024-08-09 DIAGNOSIS — M54.41 CHRONIC MIDLINE LOW BACK PAIN WITH BILATERAL SCIATICA: Primary | ICD-10-CM

## 2024-08-09 DIAGNOSIS — K59.00 CONSTIPATION, UNSPECIFIED CONSTIPATION TYPE: ICD-10-CM

## 2024-08-09 DIAGNOSIS — E11.40 TYPE 2 DIABETES MELLITUS WITH DIABETIC NEUROPATHY, UNSPECIFIED WHETHER LONG TERM INSULIN USE (HCC): ICD-10-CM

## 2024-08-09 PROCEDURE — 99309 SBSQ NF CARE MODERATE MDM 30: CPT | Performed by: INTERNAL MEDICINE

## 2024-08-09 RX ORDER — ATORVASTATIN CALCIUM 10 MG/1
10 TABLET, FILM COATED ORAL EVERY EVENING
COMMUNITY

## 2024-08-09 RX ORDER — METHOCARBAMOL 500 MG/1
500 TABLET, FILM COATED ORAL 3 TIMES DAILY PRN
COMMUNITY

## 2024-08-09 RX ORDER — MECLIZINE HCL 12.5 MG/1
12.5 TABLET ORAL 3 TIMES DAILY PRN
COMMUNITY
End: 2024-08-16

## 2024-08-09 NOTE — ASSESSMENT & PLAN NOTE
Has TEDs stocking   Also abdominal binder but reports limits his ambulation when he wears it  Will try prn meclizine to see if it helps symptoms for a week  Continue monitoring symptoms
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
## 2024-08-09 NOTE — ASSESSMENT & PLAN NOTE
Lab Results   Component Value Date    HGBA1C 6.6 (H) 07/15/2024   Hba1c better  Blood sugars better but sugars in the pm elevated   Will increase am glipizide to 5mg po and continue pm glipizide at 2.5 mg po   Will decrease to monitor blood sugars in the pm

## 2024-08-09 NOTE — ASSESSMENT & PLAN NOTE
Reports at times having difficulty moving bowels  Increase senna to 2 tab at bedtime   Continue with bowel protocol

## 2024-08-09 NOTE — ASSESSMENT & PLAN NOTE
Due to hx of fracture  Seen by pain management   Recommended to increase lyrica from 50 to 100mg if symptoms not improving   Continue tramadol and tylenol   Continue prn robaxin  Continue follow up with pain management

## 2024-08-13 ENCOUNTER — TELEPHONE (OUTPATIENT)
Age: 74
End: 2024-08-13

## 2024-08-23 DIAGNOSIS — G89.29 CHRONIC MIDLINE LOW BACK PAIN WITH BILATERAL SCIATICA: Primary | ICD-10-CM

## 2024-08-23 DIAGNOSIS — M54.42 CHRONIC MIDLINE LOW BACK PAIN WITH BILATERAL SCIATICA: Primary | ICD-10-CM

## 2024-08-23 DIAGNOSIS — M54.41 CHRONIC MIDLINE LOW BACK PAIN WITH BILATERAL SCIATICA: Primary | ICD-10-CM

## 2024-08-23 RX ORDER — PREGABALIN 150 MG/1
150 CAPSULE ORAL 2 TIMES DAILY
Qty: 60 CAPSULE | Refills: 2 | Status: SHIPPED | OUTPATIENT
Start: 2024-08-23

## 2024-08-23 RX ORDER — PREGABALIN 150 MG/1
150 CAPSULE ORAL 2 TIMES DAILY
COMMUNITY
End: 2024-08-23 | Stop reason: SDUPTHER

## 2024-09-09 ENCOUNTER — NURSING HOME VISIT (OUTPATIENT)
Dept: GERIATRICS | Facility: OTHER | Age: 74
End: 2024-09-09
Payer: COMMERCIAL

## 2024-09-09 DIAGNOSIS — Z96.0: ICD-10-CM

## 2024-09-09 DIAGNOSIS — N18.32 STAGE 3B CHRONIC KIDNEY DISEASE (HCC): ICD-10-CM

## 2024-09-09 DIAGNOSIS — E11.40 TYPE 2 DIABETES MELLITUS WITH DIABETIC NEUROPATHY, UNSPECIFIED WHETHER LONG TERM INSULIN USE (HCC): Primary | ICD-10-CM

## 2024-09-09 DIAGNOSIS — R26.2 AMBULATORY DYSFUNCTION: ICD-10-CM

## 2024-09-09 DIAGNOSIS — N20.0 NEPHROLITHIASIS: ICD-10-CM

## 2024-09-09 PROCEDURE — 99309 SBSQ NF CARE MODERATE MDM 30: CPT | Performed by: INTERNAL MEDICINE

## 2024-09-09 NOTE — ASSESSMENT & PLAN NOTE
Lab Results   Component Value Date    EGFR 36 (L) 09/09/2024    EGFR 27 (L) 08/08/2024    EGFR 34 (L) 05/13/2024    CREATININE 1.95 (H) 09/09/2024    CREATININE 2.45 (H) 08/08/2024    CREATININE 2.03 (H) 05/13/2024     Slightly improved on today's test  Continue monitoring kidney function  Will fax over result to nephrology
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
## 2024-09-09 NOTE — ASSESSMENT & PLAN NOTE
Patient had passed a kidney stone about 2 weeks ago but seems to be asymptomatic  Continue monitoring symptoms

## 2024-09-09 NOTE — ASSESSMENT & PLAN NOTE
Lab Results   Component Value Date    HGBA1C 6.6 (H) 07/15/2024   Blood sugars continues to be elevated during the evening   Will increase glipizide to 5mg po twice a day  Continue monitoring blood sugars

## 2024-10-09 ENCOUNTER — NURSING HOME VISIT (OUTPATIENT)
Dept: GERIATRICS | Facility: OTHER | Age: 74
End: 2024-10-09
Payer: COMMERCIAL

## 2024-10-09 DIAGNOSIS — R60.0 LOCALIZED EDEMA: ICD-10-CM

## 2024-10-09 DIAGNOSIS — I10 ESSENTIAL (PRIMARY) HYPERTENSION: ICD-10-CM

## 2024-10-09 DIAGNOSIS — E11.40 TYPE 2 DIABETES MELLITUS WITH DIABETIC NEUROPATHY, UNSPECIFIED WHETHER LONG TERM INSULIN USE (HCC): Primary | ICD-10-CM

## 2024-10-09 DIAGNOSIS — N13.9 OBSTRUCTIVE UROPATHY: ICD-10-CM

## 2024-10-09 DIAGNOSIS — R26.2 AMBULATORY DYSFUNCTION: ICD-10-CM

## 2024-10-09 PROCEDURE — 99309 SBSQ NF CARE MODERATE MDM 30: CPT | Performed by: INTERNAL MEDICINE

## 2024-10-09 RX ORDER — FERROUS SULFATE 325(65) MG
325 TABLET ORAL
COMMUNITY

## 2024-10-09 NOTE — ASSESSMENT & PLAN NOTE
BP reviewed from facility records, acceptable range   Continue lasix and aldactone   Continue monitoring BP

## 2024-10-09 NOTE — ASSESSMENT & PLAN NOTE
Continues to work with restorative  Currently has submitted papers for [employer] and [employer] and waiting for approval   Continue with safety measures  Continue with fall precautions
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
## 2024-10-09 NOTE — ASSESSMENT & PLAN NOTE
Lab Results   Component Value Date    HGBA1C 6.6 (H) 07/15/2024     Evening blood sugars continues to be elevated  Will increase glipizide to 7.5mg po twice a day  Continue monitoring blood sugars  Repeat hbA1c next week

## 2024-10-09 NOTE — ASSESSMENT & PLAN NOTE
Suprapubic cath removed  Reports voiding and does not have any retention   Continue whit flomax   Continue monitoring for retention

## 2024-10-09 NOTE — ASSESSMENT & PLAN NOTE
Lasix adjusted by nephrology  Continue lasix 80mg alternating with 40mg BID  Continue aldactone   Continue monitoring swelling and weight   Also has ascites which he has had paracentesis done for

## 2024-11-04 ENCOUNTER — NURSING HOME VISIT (OUTPATIENT)
Dept: GERIATRICS | Facility: OTHER | Age: 74
End: 2024-11-04
Payer: COMMERCIAL

## 2024-11-04 DIAGNOSIS — I48.0 PAROXYSMAL ATRIAL FIBRILLATION (HCC): ICD-10-CM

## 2024-11-04 DIAGNOSIS — R60.1 ANASARCA: ICD-10-CM

## 2024-11-04 DIAGNOSIS — E11.21 TYPE 2 DIABETES MELLITUS WITH DIABETIC NEPHROPATHY, UNSPECIFIED WHETHER LONG TERM INSULIN USE (HCC): Primary | ICD-10-CM

## 2024-11-04 DIAGNOSIS — N13.9 OBSTRUCTIVE UROPATHY: ICD-10-CM

## 2024-11-04 DIAGNOSIS — F32.1 CURRENT MODERATE EPISODE OF MAJOR DEPRESSIVE DISORDER, UNSPECIFIED WHETHER RECURRENT (HCC): ICD-10-CM

## 2024-11-04 PROCEDURE — 99309 SBSQ NF CARE MODERATE MDM 30: CPT | Performed by: INTERNAL MEDICINE

## 2024-11-04 RX ORDER — TORSEMIDE 20 MG/1
30 TABLET ORAL
COMMUNITY

## 2024-11-04 RX ORDER — GLIMEPIRIDE 1 MG/1
1 TABLET ORAL
COMMUNITY
Start: 2024-11-05

## 2024-11-04 NOTE — ASSESSMENT & PLAN NOTE
Lab Results   Component Value Date    HGBA1C 6.6 (H) 07/15/2024     Had complaint of feeling like tongue swollen over the past few weeks but no signs that it is really significantly swollen and reports symptoms started after increasing glipizide. Did decrease glipizide back down but symptoms still present and no signs or complaints of respiratory symptoms. But blood sugars has started to trend up again. Cardiology did want to consider starting Jardiance but concerned for his urinary retention did not want to start it right now. Therefor will discontinue glipizide and start patient on glimepiride.   Continue with CCD diet   Will also put patient on sliding scale BID when blood sugars is checked due to change in medications
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
## 2024-11-04 NOTE — ASSESSMENT & PLAN NOTE
Rate controlled  Reviewed cardiology follow up appointment recommendations  Continue monitoring rate  Not on meds

## 2024-11-04 NOTE — ASSESSMENT & PLAN NOTE
Has been voiding   Wants to discontinue flomax - explained the risk of discontinue that symptoms may come back and may need catheter if it does as meds may take time to open up. Understands and does want to go ahead and try   Continue monitoring for urinary retention

## 2024-11-04 NOTE — ASSESSMENT & PLAN NOTE
Seen by cardiology - will be getting a repeat ECHO  Cardiology had discontinued lasix and started on torsemide but with still significant edema and weight going up will increase the torsemide to 30mg po BID   Continue spironolactone   Continue monitoring fluids status   Continue monitoring weight   Ordered abdominal US as patient complaining feeling full again which maybe due to subcutaneous fluid build up

## 2024-11-04 NOTE — ASSESSMENT & PLAN NOTE
Seems to be stable mood  Continue with trazodone  Continue monitoring symptoms   Continue with supportive care

## 2024-11-18 DIAGNOSIS — M54.42 CHRONIC MIDLINE LOW BACK PAIN WITH BILATERAL SCIATICA: ICD-10-CM

## 2024-11-18 DIAGNOSIS — M54.41 CHRONIC MIDLINE LOW BACK PAIN WITH BILATERAL SCIATICA: ICD-10-CM

## 2024-11-18 DIAGNOSIS — G89.29 CHRONIC MIDLINE LOW BACK PAIN WITH BILATERAL SCIATICA: ICD-10-CM

## 2024-11-18 RX ORDER — PREGABALIN 150 MG/1
150 CAPSULE ORAL 2 TIMES DAILY
Qty: 60 CAPSULE | Refills: 2 | Status: SHIPPED | OUTPATIENT
Start: 2024-11-18

## 2024-11-22 ENCOUNTER — NURSING HOME VISIT (OUTPATIENT)
Dept: GERIATRICS | Facility: OTHER | Age: 74
End: 2024-11-22
Payer: COMMERCIAL

## 2024-11-22 DIAGNOSIS — E11.40 TYPE 2 DIABETES MELLITUS WITH DIABETIC NEUROPATHY, UNSPECIFIED WHETHER LONG TERM INSULIN USE (HCC): ICD-10-CM

## 2024-11-22 DIAGNOSIS — R13.12 OROPHARYNGEAL DYSPHAGIA: ICD-10-CM

## 2024-11-22 DIAGNOSIS — A43.9 NOCARDIA INFECTION: Primary | ICD-10-CM

## 2024-11-22 DIAGNOSIS — R26.2 AMBULATORY DYSFUNCTION: ICD-10-CM

## 2024-11-22 DIAGNOSIS — R60.1 ANASARCA: ICD-10-CM

## 2024-11-22 DIAGNOSIS — N18.32 STAGE 3B CHRONIC KIDNEY DISEASE (HCC): ICD-10-CM

## 2024-11-22 DIAGNOSIS — G06.0 BRAIN ABSCESS: ICD-10-CM

## 2024-11-22 DIAGNOSIS — E78.2 MIXED HYPERLIPIDEMIA: ICD-10-CM

## 2024-11-22 DIAGNOSIS — S32.010A CLOSED COMPRESSION FRACTURE OF BODY OF L1 VERTEBRA (HCC): ICD-10-CM

## 2024-11-22 DIAGNOSIS — Z98.890 H/O CRANIOTOMY: ICD-10-CM

## 2024-11-22 DIAGNOSIS — K55.9 ISCHEMIC BOWEL SYNDROME (HCC): ICD-10-CM

## 2024-11-22 DIAGNOSIS — F32.1 CURRENT MODERATE EPISODE OF MAJOR DEPRESSIVE DISORDER, UNSPECIFIED WHETHER RECURRENT (HCC): ICD-10-CM

## 2024-11-22 DIAGNOSIS — K74.60 CIRRHOSIS OF LIVER WITH ASCITES, UNSPECIFIED HEPATIC CIRRHOSIS TYPE  (HCC): ICD-10-CM

## 2024-11-22 DIAGNOSIS — D64.9 ANEMIA, UNSPECIFIED TYPE: ICD-10-CM

## 2024-11-22 DIAGNOSIS — N13.9 OBSTRUCTIVE UROPATHY: ICD-10-CM

## 2024-11-22 DIAGNOSIS — I48.0 PAROXYSMAL ATRIAL FIBRILLATION (HCC): ICD-10-CM

## 2024-11-22 DIAGNOSIS — R18.8 OTHER ASCITES: ICD-10-CM

## 2024-11-22 DIAGNOSIS — R18.8 CIRRHOSIS OF LIVER WITH ASCITES, UNSPECIFIED HEPATIC CIRRHOSIS TYPE  (HCC): ICD-10-CM

## 2024-11-22 DIAGNOSIS — I10 ESSENTIAL (PRIMARY) HYPERTENSION: ICD-10-CM

## 2024-11-22 PROCEDURE — 99316 NF DSCHRG MGMT 30 MIN+: CPT | Performed by: INTERNAL MEDICINE

## 2024-11-22 NOTE — ASSESSMENT & PLAN NOTE
Had a joshua for awhile then had suprapubic cath but all has been removed   Has been voiding without any difficulty   Continue monitoring for urinary retention
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
## 2024-11-22 NOTE — ASSESSMENT & PLAN NOTE
Continues with bactrim for suppression treatment  Has been followed by ID   Continue monitoring symptoms

## 2024-11-22 NOTE — ASSESSMENT & PLAN NOTE
???   Unclear if it is really cirrhosis   Seen by GI  Continue monitoring symptoms  Continue aldactone

## 2024-11-22 NOTE — ASSESSMENT & PLAN NOTE
Hx of multiple paracentesis   Continue current meds  Continue monitoring fluids status   Last abdominal US at the facility was negative for fluids

## 2024-11-22 NOTE — ASSESSMENT & PLAN NOTE
Lab Results   Component Value Date    HGBA1C 6.6 (H) 07/15/2024   Last HGBA1C was 6.5 on 10/15/24    Blood sugars improving now on glimepiride 2 mg po daily  Continue monitoring blood sugars  Continue with CCD diet

## 2024-11-22 NOTE — ASSESSMENT & PLAN NOTE
Lab Results   Component Value Date    EGFR 28 (L) 09/19/2024    EGFR 36 (L) 09/09/2024    EGFR 27 (L) 08/08/2024    CREATININE 2.40 (H) 09/19/2024    CREATININE 1.95 (H) 09/09/2024    CREATININE 2.45 (H) 08/08/2024     Fluctuates between stage III and IV the numbers  Continue follow up with nephrology   Continue with diuretics  Continue monitoring kidney function.

## 2024-11-22 NOTE — ASSESSMENT & PLAN NOTE
Multifactorial  Transferred to [facility]   Continue with safety measures   Continue with fall precautions   Will be getting PT eval  Patient on admission on 10/20/22 was non ambulatory and has over time has progressed to the point able to use walker and with assistance ambulate on discharge

## 2024-11-22 NOTE — ASSESSMENT & PLAN NOTE
Pain mostly controlled  Currently on lyrica, prn tramadol and prn robaxin   Continue with activity as tolerable

## 2024-11-22 NOTE — ASSESSMENT & PLAN NOTE
Followed by Cardiology, GI and nephrology   ECHO done yesterday no report available yet  Continue with torsemide but may need adjustment   Continue with aldactone  Continue monitoring fluids  Continue monitoring weight

## 2024-11-22 NOTE — ASSESSMENT & PLAN NOTE
Had peg tube in place on admission to [facility] on 10/20/22 and with improvement of swallowing patient and advancement of diet he had the peg tube discontinued   Continue monitoring for aspiration  Continue with current diet

## 2024-11-22 NOTE — ASSESSMENT & PLAN NOTE
S/p evacuation on 8/3/22 with cultures growing nocardia   Continues on chronic bactrim  Followed by ID  Continue monitoring symptoms

## 2024-11-22 NOTE — ASSESSMENT & PLAN NOTE
Seems to be better mood  Continue with trazodone  Continue monitoring symptoms   Continue with supportive care

## 2025-07-06 NOTE — ASSESSMENT & PLAN NOTE
Continue joshua cath  Continue current meds  Continue follow up with urology
Continues to working with restorative  Would benefit from more extensive therapy but insurance not covering  Continue with safety measures  Continue with fall precautions
Seems to be worst in the foot  Will increase gabapentin  Continue monitoring symptoms  Also asked patient to talk to neurosurgery to see if an MRI will help evaluate since symptoms getting worst
Seen by cardiology   ECHO normal as per report  Continue current meds  Continue monitoring symptom   Continues to slowly develop more fluid build up and requiring frequent paracentesis
No